# Patient Record
Sex: FEMALE | Race: WHITE | NOT HISPANIC OR LATINO
[De-identification: names, ages, dates, MRNs, and addresses within clinical notes are randomized per-mention and may not be internally consistent; named-entity substitution may affect disease eponyms.]

---

## 2017-08-11 ENCOUNTER — APPOINTMENT (OUTPATIENT)
Dept: ORTHOPEDIC SURGERY | Facility: CLINIC | Age: 32
End: 2017-08-11
Payer: COMMERCIAL

## 2017-08-11 PROCEDURE — 99203 OFFICE O/P NEW LOW 30 MIN: CPT

## 2017-08-11 PROCEDURE — 73503 X-RAY EXAM HIP UNI 4/> VIEWS: CPT | Mod: LT

## 2017-08-16 ENCOUNTER — OUTPATIENT (OUTPATIENT)
Dept: OUTPATIENT SERVICES | Facility: HOSPITAL | Age: 32
LOS: 1 days | End: 2017-08-16
Payer: COMMERCIAL

## 2017-08-16 ENCOUNTER — APPOINTMENT (OUTPATIENT)
Dept: MRI IMAGING | Facility: CLINIC | Age: 32
End: 2017-08-16
Payer: COMMERCIAL

## 2017-08-16 DIAGNOSIS — M25.852 OTHER SPECIFIED JOINT DISORDERS, LEFT HIP: ICD-10-CM

## 2017-08-16 DIAGNOSIS — Z00.8 ENCOUNTER FOR OTHER GENERAL EXAMINATION: ICD-10-CM

## 2017-08-16 PROCEDURE — 73721 MRI JNT OF LWR EXTRE W/O DYE: CPT

## 2017-08-16 PROCEDURE — 73721 MRI JNT OF LWR EXTRE W/O DYE: CPT | Mod: 26,LT

## 2017-11-18 ENCOUNTER — TRANSCRIPTION ENCOUNTER (OUTPATIENT)
Age: 32
End: 2017-11-18

## 2017-11-24 ENCOUNTER — TRANSCRIPTION ENCOUNTER (OUTPATIENT)
Age: 32
End: 2017-11-24

## 2017-12-01 ENCOUNTER — APPOINTMENT (OUTPATIENT)
Dept: INTERNAL MEDICINE | Facility: CLINIC | Age: 32
End: 2017-12-01
Payer: COMMERCIAL

## 2017-12-01 VITALS
TEMPERATURE: 98.2 F | DIASTOLIC BLOOD PRESSURE: 88 MMHG | OXYGEN SATURATION: 96 % | HEART RATE: 100 BPM | RESPIRATION RATE: 16 BRPM | SYSTOLIC BLOOD PRESSURE: 139 MMHG | HEIGHT: 67 IN

## 2017-12-01 PROCEDURE — 99214 OFFICE O/P EST MOD 30 MIN: CPT

## 2017-12-01 RX ORDER — PREDNISONE 20 MG/1
20 TABLET ORAL
Qty: 6 | Refills: 0 | Status: DISCONTINUED | COMMUNITY
Start: 2017-11-18 | End: 2017-12-01

## 2018-01-19 ENCOUNTER — APPOINTMENT (OUTPATIENT)
Dept: INTERNAL MEDICINE | Facility: CLINIC | Age: 33
End: 2018-01-19
Payer: COMMERCIAL

## 2018-01-19 ENCOUNTER — LABORATORY RESULT (OUTPATIENT)
Age: 33
End: 2018-01-19

## 2018-01-19 VITALS
TEMPERATURE: 98.5 F | HEART RATE: 100 BPM | HEIGHT: 67 IN | RESPIRATION RATE: 16 BRPM | SYSTOLIC BLOOD PRESSURE: 134 MMHG | OXYGEN SATURATION: 98 % | DIASTOLIC BLOOD PRESSURE: 84 MMHG | WEIGHT: 215 LBS | BODY MASS INDEX: 33.74 KG/M2

## 2018-01-19 DIAGNOSIS — Z87.09 PERSONAL HISTORY OF OTHER DISEASES OF THE RESPIRATORY SYSTEM: ICD-10-CM

## 2018-01-19 DIAGNOSIS — R05 COUGH: ICD-10-CM

## 2018-01-19 PROCEDURE — 99395 PREV VISIT EST AGE 18-39: CPT

## 2018-01-19 RX ORDER — LEVOFLOXACIN 500 MG/1
500 TABLET, FILM COATED ORAL DAILY
Qty: 7 | Refills: 0 | Status: DISCONTINUED | COMMUNITY
Start: 2017-12-01 | End: 2018-01-19

## 2018-01-19 RX ORDER — BENZONATATE 100 MG/1
100 CAPSULE ORAL
Qty: 21 | Refills: 0 | Status: DISCONTINUED | COMMUNITY
Start: 2017-11-18 | End: 2018-01-19

## 2018-01-19 RX ORDER — PROMETHAZINE HYDROCHLORIDE AND DEXTROMETHORPHAN HYDROBROMIDE ORAL SOLUTION 15; 6.25 MG/5ML; MG/5ML
6.25-15 SOLUTION ORAL EVERY 8 HOURS
Qty: 1 | Refills: 1 | Status: DISCONTINUED | COMMUNITY
Start: 2017-12-01 | End: 2018-01-19

## 2018-01-23 LAB
25(OH)D3 SERPL-MCNC: 21.3 NG/ML
ALBUMIN SERPL ELPH-MCNC: 4.5 G/DL
ALP BLD-CCNC: 75 U/L
ALT SERPL-CCNC: 50 U/L
ANION GAP SERPL CALC-SCNC: 18 MMOL/L
APPEARANCE: ABNORMAL
AST SERPL-CCNC: 25 U/L
BASOPHILS # BLD AUTO: 0.02 K/UL
BASOPHILS NFR BLD AUTO: 0.2 %
BILIRUB SERPL-MCNC: 0.5 MG/DL
BILIRUBIN URINE: NEGATIVE
BLOOD URINE: NEGATIVE
BUN SERPL-MCNC: 10 MG/DL
CALCIUM SERPL-MCNC: 10.6 MG/DL
CHLORIDE SERPL-SCNC: 100 MMOL/L
CO2 SERPL-SCNC: 23 MMOL/L
COLOR: YELLOW
CREAT SERPL-MCNC: 0.88 MG/DL
EOSINOPHIL # BLD AUTO: 0.09 K/UL
EOSINOPHIL NFR BLD AUTO: 0.9 %
FERRITIN SERPL-MCNC: 189 NG/ML
FOLATE SERPL-MCNC: 4.3 NG/ML
GLUCOSE QUALITATIVE U: NEGATIVE MG/DL
GLUCOSE SERPL-MCNC: 75 MG/DL
HAV IGM SER QL: NONREACTIVE
HBA1C MFR BLD HPLC: 4.9 %
HBV CORE IGM SER QL: NONREACTIVE
HBV SURFACE AG SER QL: NONREACTIVE
HCT VFR BLD CALC: 43.2 %
HCV AB SER QL: NONREACTIVE
HCV RNA SERPL NAA DL=5-ACNC: NOT DETECTED IU/ML
HCV RNA SERPL NAA+PROBE-LOG IU: NOT DETECTED LOGIU/ML
HCV S/CO RATIO: 0.21 S/CO
HGB BLD-MCNC: 14.6 G/DL
IMM GRANULOCYTES NFR BLD AUTO: 0.1 %
IRON SERPL-MCNC: 78 UG/DL
KETONES URINE: NEGATIVE
LEUKOCYTE ESTERASE URINE: NEGATIVE
LYMPHOCYTES # BLD AUTO: 3.59 K/UL
LYMPHOCYTES NFR BLD AUTO: 37.6 %
MAN DIFF?: NORMAL
MCHC RBC-ENTMCNC: 31.7 PG
MCHC RBC-ENTMCNC: 33.8 GM/DL
MCV RBC AUTO: 93.7 FL
MONOCYTES # BLD AUTO: 0.44 K/UL
MONOCYTES NFR BLD AUTO: 4.6 %
NEUTROPHILS # BLD AUTO: 5.41 K/UL
NEUTROPHILS NFR BLD AUTO: 56.6 %
NITRITE URINE: NEGATIVE
PH URINE: 5.5
PLATELET # BLD AUTO: 305 K/UL
POTASSIUM SERPL-SCNC: 4.6 MMOL/L
PROT SERPL-MCNC: 7.8 G/DL
PROTEIN URINE: NEGATIVE MG/DL
RBC # BLD: 4.61 M/UL
RBC # FLD: 13.9 %
SODIUM SERPL-SCNC: 141 MMOL/L
SPECIFIC GRAVITY URINE: 1.02
TSH SERPL-ACNC: 2.99 UIU/ML
UROBILINOGEN URINE: NEGATIVE MG/DL
VIT B12 SERPL-MCNC: 807 PG/ML
WBC # FLD AUTO: 9.56 K/UL

## 2018-04-22 ENCOUNTER — RESULT REVIEW (OUTPATIENT)
Age: 33
End: 2018-04-22

## 2019-08-01 ENCOUNTER — APPOINTMENT (OUTPATIENT)
Dept: DERMATOLOGY | Facility: CLINIC | Age: 34
End: 2019-08-01
Payer: COMMERCIAL

## 2019-08-01 PROCEDURE — 99203 OFFICE O/P NEW LOW 30 MIN: CPT

## 2019-08-01 NOTE — PHYSICAL EXAM
[FreeTextEntry3] : AAOx3, pleasant, NAD, no visual lymphadenopathy\par hair, scalp, face, nose, eyelids, ears, lips, oropharynx, neck, chest, abdomen, back, right arm, left arm, nails, and hands examined with all normal findings,\par pertinent findings include:\par \par 2 cystic nodules on scalp

## 2019-08-01 NOTE — HISTORY OF PRESENT ILLNESS
[de-identified] : 34 year old here for cysts on scalp. Has previously had excisions previously.  [FreeTextEntry1] : cysts on scalp

## 2019-09-11 ENCOUNTER — APPOINTMENT (OUTPATIENT)
Dept: DERMATOLOGY | Facility: CLINIC | Age: 34
End: 2019-09-11
Payer: COMMERCIAL

## 2019-09-11 ENCOUNTER — LABORATORY RESULT (OUTPATIENT)
Age: 34
End: 2019-09-11

## 2019-09-11 PROCEDURE — 12032 INTMD RPR S/A/T/EXT 2.6-7.5: CPT

## 2019-09-11 PROCEDURE — 11402 EXC TR-EXT B9+MARG 1.1-2 CM: CPT

## 2019-09-17 ENCOUNTER — LABORATORY RESULT (OUTPATIENT)
Age: 34
End: 2019-09-17

## 2019-09-18 ENCOUNTER — APPOINTMENT (OUTPATIENT)
Dept: DERMATOLOGY | Facility: CLINIC | Age: 34
End: 2019-09-18
Payer: COMMERCIAL

## 2019-09-18 PROCEDURE — 11402 EXC TR-EXT B9+MARG 1.1-2 CM: CPT | Mod: GC,58

## 2019-09-18 PROCEDURE — 12031 INTMD RPR S/A/T/EXT 2.5 CM/<: CPT | Mod: GC,58

## 2019-09-25 ENCOUNTER — APPOINTMENT (OUTPATIENT)
Dept: DERMATOLOGY | Facility: CLINIC | Age: 34
End: 2019-09-25
Payer: COMMERCIAL

## 2019-09-25 PROCEDURE — 99024 POSTOP FOLLOW-UP VISIT: CPT | Mod: GC

## 2019-10-03 ENCOUNTER — APPOINTMENT (OUTPATIENT)
Dept: INTERNAL MEDICINE | Facility: CLINIC | Age: 34
End: 2019-10-03
Payer: COMMERCIAL

## 2019-10-03 VITALS
HEIGHT: 67 IN | DIASTOLIC BLOOD PRESSURE: 83 MMHG | BODY MASS INDEX: 31.86 KG/M2 | WEIGHT: 203 LBS | HEART RATE: 84 BPM | SYSTOLIC BLOOD PRESSURE: 124 MMHG | OXYGEN SATURATION: 98 %

## 2019-10-03 DIAGNOSIS — L72.11 PILAR CYST: ICD-10-CM

## 2019-10-03 DIAGNOSIS — K21.9 GASTRO-ESOPHAGEAL REFLUX DISEASE W/OUT ESOPHAGITIS: ICD-10-CM

## 2019-10-03 PROCEDURE — 99395 PREV VISIT EST AGE 18-39: CPT

## 2019-10-03 RX ORDER — ALBUTEROL SULFATE 90 UG/1
108 (90 BASE) AEROSOL, METERED RESPIRATORY (INHALATION)
Qty: 18 | Refills: 0 | Status: DISCONTINUED | COMMUNITY
Start: 2017-11-24 | End: 2019-10-03

## 2019-10-03 RX ORDER — IPRATROPIUM BROMIDE 21 UG/1
0.03 SPRAY NASAL
Qty: 30 | Refills: 0 | Status: DISCONTINUED | COMMUNITY
Start: 2017-11-24 | End: 2019-10-03

## 2019-10-03 RX ORDER — RANITIDINE HCL 150 MG
CAPSULE ORAL
Refills: 0 | Status: DISCONTINUED | COMMUNITY
End: 2019-10-03

## 2019-12-03 ENCOUNTER — TRANSCRIPTION ENCOUNTER (OUTPATIENT)
Age: 34
End: 2019-12-03

## 2020-01-01 ENCOUNTER — TRANSCRIPTION ENCOUNTER (OUTPATIENT)
Age: 35
End: 2020-01-01

## 2020-04-25 ENCOUNTER — MESSAGE (OUTPATIENT)
Age: 35
End: 2020-04-25

## 2020-05-06 ENCOUNTER — APPOINTMENT (OUTPATIENT)
Dept: DISASTER EMERGENCY | Facility: HOSPITAL | Age: 35
End: 2020-05-06

## 2020-05-07 LAB
SARS-COV-2 IGG SERPL IA-ACNC: <0.1 INDEX
SARS-COV-2 IGG SERPL QL IA: NEGATIVE

## 2020-08-14 ENCOUNTER — APPOINTMENT (OUTPATIENT)
Dept: ORTHOPEDIC SURGERY | Facility: CLINIC | Age: 35
End: 2020-08-14
Payer: COMMERCIAL

## 2020-08-14 ENCOUNTER — APPOINTMENT (OUTPATIENT)
Dept: MRI IMAGING | Facility: CLINIC | Age: 35
End: 2020-08-14
Payer: COMMERCIAL

## 2020-08-14 ENCOUNTER — OUTPATIENT (OUTPATIENT)
Dept: OUTPATIENT SERVICES | Facility: HOSPITAL | Age: 35
LOS: 1 days | End: 2020-08-14
Payer: COMMERCIAL

## 2020-08-14 VITALS
HEART RATE: 89 BPM | SYSTOLIC BLOOD PRESSURE: 131 MMHG | BODY MASS INDEX: 39.27 KG/M2 | HEIGHT: 60 IN | DIASTOLIC BLOOD PRESSURE: 85 MMHG | WEIGHT: 200 LBS

## 2020-08-14 VITALS — TEMPERATURE: 98 F

## 2020-08-14 DIAGNOSIS — Z00.8 ENCOUNTER FOR OTHER GENERAL EXAMINATION: ICD-10-CM

## 2020-08-14 PROCEDURE — 73502 X-RAY EXAM HIP UNI 2-3 VIEWS: CPT | Mod: LT

## 2020-08-14 PROCEDURE — 99203 OFFICE O/P NEW LOW 30 MIN: CPT

## 2020-08-14 PROCEDURE — 73721 MRI JNT OF LWR EXTRE W/O DYE: CPT | Mod: 26,LT

## 2020-08-14 PROCEDURE — 73721 MRI JNT OF LWR EXTRE W/O DYE: CPT

## 2020-08-14 NOTE — DISCUSSION/SUMMARY
[de-identified] : Left hip osteoarthritis, labral tearing and femoral acetabular impingement. We'll arrange for a guided intra-articular injection for pain control. We will obtain a new MRI to further discuss surgical planning based on results of her MRI and the underlying chondral damage. all questions answered

## 2020-08-14 NOTE — PHYSICAL EXAM
[de-identified] : L hip exam\par \par Skin: Clean/dry and intact\par Inspection: No obvious deformity, no swelling, no ecchymosis.\par Tenderness: Significant tenderness over greater trochanter/glut medius insertion. No tenderness pubic symphysis, pubic tubercle, hip flexors. No ttp ischial tuberosity or buttock. No ttp over the ASIS/Illiac crest.\par ROM: 0-120°. Internal rotation 10° external rotation 60° (right 40/60)\par Painful ROM: None\par Additional tests: Negative Santosh negative Stinchfield positive impingement at 60 and 90\par Strength: 5/5 hip flexion/ADD/ABD/Q/H/TA/GS/EHL\par Neuro: Sensation in tact to light touch throughout in dp/sp/tib/angelo/saph distributions\par Pulses: 2+ DP/PT pulses [de-identified] : \par The following radiographs were ordered and read by me during this patients visit. I reviewed each radiograph in detail with the patient and discussed the findings as highlighted below. \par \par AP pelvis AP lateral left hip were obtained today 3 if there are moderate degenerative changes with joint space narrowing and marginal osteophytes. Slight progression from previous. Cam type impingement\par \par

## 2020-08-14 NOTE — HISTORY OF PRESENT ILLNESS
[de-identified] : 34yo female presents for follow up right hip pain. She was here approximately 3 years ago, had MRI showing cam deformity, EDEN, chondral loss and cystic changes within her hip. Since then she has not done much to treat this. She takes occasional anti-inflammatory medicine. If his pain deep within her hip, lateral aspect. 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 Overall worsening. Denies numbness tingling

## 2020-08-18 ENCOUNTER — APPOINTMENT (OUTPATIENT)
Dept: ORTHOPEDIC SURGERY | Facility: CLINIC | Age: 35
End: 2020-08-18
Payer: COMMERCIAL

## 2020-08-18 PROCEDURE — 99441: CPT

## 2020-08-21 ENCOUNTER — APPOINTMENT (OUTPATIENT)
Dept: ORTHOPEDIC SURGERY | Facility: CLINIC | Age: 35
End: 2020-08-21
Payer: COMMERCIAL

## 2020-08-21 VITALS
SYSTOLIC BLOOD PRESSURE: 128 MMHG | HEART RATE: 79 BPM | DIASTOLIC BLOOD PRESSURE: 88 MMHG | WEIGHT: 198 LBS | BODY MASS INDEX: 38.87 KG/M2 | HEIGHT: 60 IN

## 2020-08-21 PROCEDURE — 99214 OFFICE O/P EST MOD 30 MIN: CPT

## 2020-08-21 PROCEDURE — 72100 X-RAY EXAM L-S SPINE 2/3 VWS: CPT

## 2020-08-21 NOTE — DISCUSSION/SUMMARY
[de-identified] : Left hip advanced degenerative joint disease secondary to EDEN\par The natural history and treatment of degenerative arthritis was discussed with the patient at length today. The spectrum of treatment including nonoperative modalities to surgical intervention was elucidated. Noninvasive and nonoperative treatment modalities include weight reduction, activity modification with low impact exercise,  as needed use of acetaminophen or anti-inflammatory medications if tolerated, glucosamine/chondroitin supplements, and physical therapy. Further treatments can include corticosteroid injection and the use of viscosupplementation with hyaluronic acid injections. Definitive surgical treatment can certainly include total joint arthroplasty also. The risks and benefits of each treatment options was discussed and all questions were answered.\par In view of lack of adequate pain relief with conservative (non-surgical) management protocol including physical therapy, home exercises, weight loss, activity modification, NSAIDS; the patient is recommended to consider a LEFT Total Hip Replacement. \par \par The risks, benefits, alternatives, implications, complications including but not limited to pain, stiffness, bleeding, limp, wound breakdown, infection, limb length discrepancy, dislocation, bone fracture, nerve and vascular compromise, implant wear and durability issues and rehabilitation were discussed and relevant questions were addressed. The possibility of recurrent pain, no improvement in pain and actual worsening of the pain were also mentioned in conversation with the patient. Medical complications related to the patient's general medical health including deep vein thrombosis, pulmonary embolus, heart attack, stroke, death and other complications from anesthesia were discussed as well. Anticoagulation prophylaxis medication options to address risks of deep vein thrombosis and pulmonary embolism were discussed and weighed against the risks of bleeding and wound healing complications. The patient elected Ecotrin/Xarelto prophylaxis with mechanical modalities.\par \par I have reviewed the plan of care as well as a model of a total hip replacement implant equivalent to the one that will be used for their total hip replacement.  The patient agrees with the plan of care, as well as the use of implants for their total hip replacement.  The patient wishes to proceed and will undergo preoperative medical evaluation and clearance, attend the preoperative educational class and will schedule surgery appropriately.\par \par She is a good candidate for same day discharge following the operation due to minimal comorbidities and age. \par

## 2020-08-21 NOTE — CONSULT LETTER
[Dear  ___] : Dear  [unfilled], [Please see my note below.] : Please see my note below. [Consult Closing:] : Thank you very much for allowing me to participate in the care of this patient.  If you have any questions, please do not hesitate to contact me. [Consult Letter:] : I had the pleasure of evaluating your patient, [unfilled]. [Sincerely,] : Sincerely, [FreeTextEntry2] : JONELLE YOUNG\par  [FreeTextEntry3] : Joseph Garcia MD\par \par ______________________________________________\par [facility]: Hip/Knee Arthroplasty\par [address], [address], [place] [address]\par (t) [phone]\par (f) [phone]\par

## 2020-08-21 NOTE — PHYSICAL EXAM
[de-identified] : On general examination the patient is adequately groomed and nourished. The vital parameters are as recorded. \par There is no lymphedema or diffuse swelling, no varicose veins, no skin warmth/erythema/scars/swelling, no ulcers and no palpable lymph nodes or masses in both lower extremities. Bilateral pedal pulses are well palpable.\par Upper Extremity:\par Both right and left upper extremities are unremarkable in terms of skin rash, lesions, pigmentation, redness, tenderness, swelling, joint instability, abnormal deformity or crepitus. The overall range of motion, sensation, motor tone and strength testing are normal.\par \par Hip Exam:\par The gait is left stiff hip coxalgic.\par The patient has equal leg lengths and no pelvic tilt. \par Shabbir/Santosh test is 6 inches on the right and 12 inches on the left. \par Active SLR is 60 degrees on the right and 40 degrees on the left. Both hips demonstrate no scars and the skin has no signs of inflammation or tenderness. \par Both Hips have a range of motion that is symmetrical in flexion and extension of:\par Hip flexion:            Right 100 degrees               Left 90 degrees\par Hip abduction:      Right 40 degrees                  Left 20 degrees\par Hip adduction:      Right 20 degrees                  Left  10 degrees\par Internal rotation:      Right 20 degrees                 Left 10 degrees\par External rotation:     Right 40 degrees                 Left 20 degrees\par There is no flexion contracture, deformity or instability. \par Labral impingement tests are negative.\par Right hip flexor, abductor and extensor power is grade 5.\par Left hip flexor, abductor and extensor power is grade 4+.\par \par Spine: There is no curvature of the spine or loss of normal lumbar lordosis. The skin is devoid of erythema, scars, pigmentation or rashes. There is no lumbar spasm and no tenderness especially at L4-L5 or L5-S1. \par The range of motion of the lumbar spine is normal and painless in flexion, extension, lateral flexion and rotation. There is no lumbar spine imbalance or instability detected.\par Bilateral passive SLR is 60 degrees in supine and sitting positions. Lasegue's Test is negative.\par Neurology:\par The patient is alert and oriented in person, place and time. The mood is calm and affect is normal.\par Testing for coordination including Rhomberg's Test and Finger-Nose Test, sensation, motor tone and power and deep tendon reflexes in both lower extremities is normal.\par \par \par  [de-identified] : The following radiographs were taken previously and reviewed by me during this patients visit. I reviewed each radiograph in detail with the patient and discussed the findings as highlighted below. \par AP and false profile views of the left hip and AP view of the pelvis confirm degenerative joint disease with joint space narrowing and osteophyte formation, with CAM impingement. \par MRI of the left hip taken 8/14/2020, compared to previous MRI of the left hip taken on 8/16/2017, reviewed in detail\par There is a nondisplaced anterior labral tear. There is extensive DJD of the left hip, with chondral loss and subchondral cystic change in the anterior acetabulum. There is a CAM-type deformity. There is moderate joint effusion with synovitis which has worsened compared to previous imaging. There is no fracture. There is cystic change in the femoral head as well. The above is significant for advanced degenerative joint disease of the left hip. \par AP and lateral views of the lumbar spine reveal no DJD.

## 2020-08-21 NOTE — HISTORY OF PRESENT ILLNESS
[Constant] : ~He/She~ states the symptoms seem to be constant [9] : a current pain level of 9/10 [Worsening] : worsening [Rest] : relieved by rest [Walking] : worsened by walking [de-identified] : Ms. JOE DELUCA is a 35 year old female  presents with left hip pain, present for many years now worsening. She is a nurse at [employer], and was sent by Dr. Ochoa for evaluation today.  She localizes the pain to the groin and lateral aspect of the left hip.  The patient describes the pain as constant and sharp, and states it is intermittently worsened by activity. She states the pain is present when walking, climbing stairs, standing for long periods, and deep flexion of the hip.  She admits to worsening stiffness of the hip, which is now interfering with ADLs such as dressing and toe nail care. She has been taking NSAIDs for pain with minimal relief. She has had an MRI of the left hip in the past revealing labral tear and impingement, and one recently revealing extensive DJD of the left hip. She denies injection to the left hip.  The patient admits to limitations in her  quality of life, and is present to discuss options for treatment.\par

## 2020-10-15 ENCOUNTER — APPOINTMENT (OUTPATIENT)
Dept: INTERNAL MEDICINE | Facility: CLINIC | Age: 35
End: 2020-10-15
Payer: COMMERCIAL

## 2020-10-15 VITALS
HEART RATE: 85 BPM | OXYGEN SATURATION: 98 % | DIASTOLIC BLOOD PRESSURE: 85 MMHG | HEIGHT: 62 IN | TEMPERATURE: 98.6 F | BODY MASS INDEX: 37.17 KG/M2 | SYSTOLIC BLOOD PRESSURE: 125 MMHG | WEIGHT: 202 LBS

## 2020-10-15 DIAGNOSIS — Z87.898 PERSONAL HISTORY OF OTHER SPECIFIED CONDITIONS: ICD-10-CM

## 2020-10-15 DIAGNOSIS — R53.83 OTHER MALAISE: ICD-10-CM

## 2020-10-15 DIAGNOSIS — R53.81 OTHER MALAISE: ICD-10-CM

## 2020-10-15 DIAGNOSIS — E66.9 OBESITY, UNSPECIFIED: ICD-10-CM

## 2020-10-15 DIAGNOSIS — R82.90 UNSPECIFIED ABNORMAL FINDINGS IN URINE: ICD-10-CM

## 2020-10-15 DIAGNOSIS — Z87.42 PERSONAL HISTORY OF OTHER DISEASES OF THE FEMALE GENITAL TRACT: ICD-10-CM

## 2020-10-15 DIAGNOSIS — R79.89 OTHER SPECIFIED ABNORMAL FINDINGS OF BLOOD CHEMISTRY: ICD-10-CM

## 2020-10-15 PROCEDURE — 99213 OFFICE O/P EST LOW 20 MIN: CPT | Mod: 25

## 2020-10-15 PROCEDURE — 99395 PREV VISIT EST AGE 18-39: CPT | Mod: 25

## 2020-10-15 PROCEDURE — 36415 COLL VENOUS BLD VENIPUNCTURE: CPT

## 2020-10-15 PROCEDURE — G0444 DEPRESSION SCREEN ANNUAL: CPT | Mod: NC,59

## 2020-10-15 PROCEDURE — G0447 BEHAVIOR COUNSEL OBESITY 15M: CPT | Mod: NC,59

## 2020-10-15 NOTE — HISTORY OF PRESENT ILLNESS
[de-identified] : 34 y/o female patient presents today for annual CPE/fasting labs \par - HCM - UTD preventive screenings, last PAP done 9/2020 WNL, flu vaccine to be done at work \par - f/u chronic left hip secondary to osteoarthritis- following with Chris Ponce & [facility], taking celebrex as needed for pain, will undergo hip replacement surgery scheduled for December, patient to return for medical clearance, Ortho requesting ESR and CRP drawn

## 2020-10-15 NOTE — HEALTH RISK ASSESSMENT
[] : No [No] : No [No falls in past year] : Patient reported no falls in the past year [0] : 2) Feeling down, depressed, or hopeless: Not at all (0) [[id number]] : 0 [HIV test declined] : HIV test declined [Patient reported PAP Smear was normal] : Patient reported PAP Smear was normal [Change in mental status noted] : No change in mental status noted [Hepatitis C test declined] : Hepatitis C test declined [Employed] : employed [With Family] : lives with family [None] : None [# Of Children ___] : has [unfilled] children [Sexually Active] : sexually active [Reports changes in hearing] : Reports no changes in hearing [High Risk Behavior] : no high risk behavior [Reports changes in vision] : Reports no changes in vision [Reports changes in dental health] : Reports no changes in dental health [PapSmearDate] : 09/2020 [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

## 2020-10-15 NOTE — PHYSICAL EXAM
[No Acute Distress] : no acute distress [Well Nourished] : well nourished [Well Developed] : well developed [Well-Appearing] : well-appearing [Normal Sclera/Conjunctiva] : normal sclera/conjunctiva [PERRL] : pupils equal round and reactive to light [EOMI] : extraocular movements intact [Normal Outer Ear/Nose] : the outer ears and nose were normal in appearance [Normal Oropharynx] : the oropharynx was normal [No Lymphadenopathy] : no lymphadenopathy [No JVD] : no jugular venous distention [Supple] : supple [Thyroid Normal, No Nodules] : the thyroid was normal and there were no nodules present [No Respiratory Distress] : no respiratory distress  [No Accessory Muscle Use] : no accessory muscle use [Clear to Auscultation] : lungs were clear to auscultation bilaterally [Normal Rate] : normal rate  [Regular Rhythm] : with a regular rhythm [Normal S1, S2] : normal S1 and S2 [No Murmur] : no murmur heard [No Carotid Bruits] : no carotid bruits [No Abdominal Bruit] : a ~M bruit was not heard ~T in the abdomen [No Varicosities] : no varicosities [Pedal Pulses Present] : the pedal pulses are present [No Edema] : there was no peripheral edema [No Palpable Aorta] : no palpable aorta [No Extremity Clubbing/Cyanosis] : no extremity clubbing/cyanosis [Soft] : abdomen soft [Non Tender] : non-tender [Non-distended] : non-distended [No Masses] : no abdominal mass palpated [No HSM] : no HSM [Normal Bowel Sounds] : normal bowel sounds [Normal Posterior Cervical Nodes] : no posterior cervical lymphadenopathy [Normal Anterior Cervical Nodes] : no anterior cervical lymphadenopathy [No CVA Tenderness] : no CVA  tenderness [No Spinal Tenderness] : no spinal tenderness [No Joint Swelling] : no joint swelling [Grossly Normal Strength/Tone] : grossly normal strength/tone [No Rash] : no rash [Coordination Grossly Intact] : coordination grossly intact [No Focal Deficits] : no focal deficits [Normal Gait] : normal gait [Normal Affect] : the affect was normal [Normal Insight/Judgement] : insight and judgment were intact [Normal Nasal Mucosa] : the nasal mucosa was normal [Normal TMs] : both tympanic membranes were normal [Speech Grossly Normal] : speech grossly normal [Memory Grossly Normal] : memory grossly normal [Alert and Oriented x3] : oriented to person, place, and time [de-identified] : +left hip generalized tenderness, decreased ROM, no swelling, no effusion  [Normal Mood] : the mood was normal

## 2020-10-15 NOTE — COUNSELING
[Potential consequences of obesity discussed] : Potential consequences of obesity discussed [Benefits of weight loss discussed] : Benefits of weight loss discussed [Encouraged to increase physical activity] : Encouraged to increase physical activity [Weigh Self Weekly] : weigh self weekly [Decrease Portions] : decrease portions [Good understanding] : Patient has a good understanding of disease, goals and obesity follow-up plan

## 2020-10-15 NOTE — REVIEW OF SYSTEMS
[Negative] : Heme/Lymph [Joint Pain] : joint pain [Joint Stiffness] : joint stiffness [Joint Swelling] : no joint swelling [Muscle Weakness] : no muscle weakness [Muscle Pain] : no muscle pain [Back Pain] : no back pain [FreeTextEntry9] : +left hip pain

## 2020-10-20 ENCOUNTER — TRANSCRIPTION ENCOUNTER (OUTPATIENT)
Age: 35
End: 2020-10-20

## 2020-10-20 DIAGNOSIS — Z86.39 PERSONAL HISTORY OF OTHER ENDOCRINE, NUTRITIONAL AND METABOLIC DISEASE: ICD-10-CM

## 2020-10-20 LAB
25(OH)D3 SERPL-MCNC: 31.8 NG/ML
ALBUMIN SERPL ELPH-MCNC: 4.7 G/DL
ALP BLD-CCNC: 64 U/L
ALT SERPL-CCNC: 29 U/L
ANION GAP SERPL CALC-SCNC: 12 MMOL/L
APPEARANCE: CLEAR
AST SERPL-CCNC: 19 U/L
BACTERIA: NEGATIVE
BASOPHILS # BLD AUTO: 0.04 K/UL
BASOPHILS NFR BLD AUTO: 0.6 %
BILIRUB SERPL-MCNC: 0.7 MG/DL
BILIRUBIN URINE: NEGATIVE
BLOOD URINE: NEGATIVE
BUN SERPL-MCNC: 10 MG/DL
CALCIUM SERPL-MCNC: 10 MG/DL
CHLORIDE SERPL-SCNC: 106 MMOL/L
CHOLEST SERPL-MCNC: 177 MG/DL
CHOLEST/HDLC SERPL: 4.1 RATIO
CO2 SERPL-SCNC: 24 MMOL/L
COLOR: YELLOW
CREAT SERPL-MCNC: 0.66 MG/DL
CRP SERPL-MCNC: 0.31 MG/DL
EOSINOPHIL # BLD AUTO: 0.09 K/UL
EOSINOPHIL NFR BLD AUTO: 1.4 %
ERYTHROCYTE [SEDIMENTATION RATE] IN BLOOD BY WESTERGREN METHOD: 27 MM/HR
ESTIMATED AVERAGE GLUCOSE: 94 MG/DL
GLUCOSE QUALITATIVE U: NEGATIVE
GLUCOSE SERPL-MCNC: 91 MG/DL
HBA1C MFR BLD HPLC: 4.9 %
HCT VFR BLD CALC: 43.3 %
HDLC SERPL-MCNC: 44 MG/DL
HGB BLD-MCNC: 14.3 G/DL
HYALINE CASTS: 4 /LPF
IMM GRANULOCYTES NFR BLD AUTO: 0.3 %
KETONES URINE: NEGATIVE
LDLC SERPL CALC-MCNC: 108 MG/DL
LEUKOCYTE ESTERASE URINE: ABNORMAL
LYMPHOCYTES # BLD AUTO: 2.33 K/UL
LYMPHOCYTES NFR BLD AUTO: 35.1 %
MAGNESIUM SERPL-MCNC: 2.1 MG/DL
MAN DIFF?: NORMAL
MCHC RBC-ENTMCNC: 31.6 PG
MCHC RBC-ENTMCNC: 33 GM/DL
MCV RBC AUTO: 95.8 FL
MICROSCOPIC-UA: NORMAL
MONOCYTES # BLD AUTO: 0.33 K/UL
MONOCYTES NFR BLD AUTO: 5 %
NEUTROPHILS # BLD AUTO: 3.83 K/UL
NEUTROPHILS NFR BLD AUTO: 57.6 %
NITRITE URINE: NEGATIVE
PH URINE: 5.5
PHOSPHATE SERPL-MCNC: 3.6 MG/DL
PLATELET # BLD AUTO: 253 K/UL
POTASSIUM SERPL-SCNC: 4.6 MMOL/L
PROT SERPL-MCNC: 7.4 G/DL
PROTEIN URINE: NEGATIVE
RBC # BLD: 4.52 M/UL
RBC # FLD: 12.7 %
RED BLOOD CELLS URINE: 4 /HPF
SODIUM SERPL-SCNC: 143 MMOL/L
SPECIFIC GRAVITY URINE: 1.02
SQUAMOUS EPITHELIAL CELLS: 6 /HPF
TRIGL SERPL-MCNC: 126 MG/DL
TSH SERPL-ACNC: 1.37 UIU/ML
UROBILINOGEN URINE: NORMAL
VIT B12 SERPL-MCNC: 589 PG/ML
WBC # FLD AUTO: 6.64 K/UL
WHITE BLOOD CELLS URINE: 10 /HPF

## 2020-10-26 LAB — PTH RELATED PROT SERPL-MCNC: <2 PMOL/L

## 2020-11-20 ENCOUNTER — APPOINTMENT (OUTPATIENT)
Dept: INTERNAL MEDICINE | Facility: CLINIC | Age: 35
End: 2020-11-20
Payer: COMMERCIAL

## 2020-11-20 VITALS
SYSTOLIC BLOOD PRESSURE: 141 MMHG | TEMPERATURE: 98.1 F | HEART RATE: 89 BPM | BODY MASS INDEX: 36.8 KG/M2 | HEIGHT: 62 IN | WEIGHT: 200 LBS | RESPIRATION RATE: 14 BRPM | DIASTOLIC BLOOD PRESSURE: 95 MMHG | OXYGEN SATURATION: 99 %

## 2020-11-20 PROCEDURE — 99213 OFFICE O/P EST LOW 20 MIN: CPT | Mod: 25

## 2020-11-20 PROCEDURE — G0447 BEHAVIOR COUNSEL OBESITY 15M: CPT | Mod: 59

## 2020-11-23 RX ORDER — MEDROXYPROGESTERONE ACETATE 5 MG/1
5 TABLET ORAL DAILY
Qty: 10 | Refills: 0 | Status: DISCONTINUED | COMMUNITY
Start: 2020-10-15 | End: 2020-11-22

## 2020-11-23 NOTE — HISTORY OF PRESENT ILLNESS
[de-identified] : 34 y/o female patient presents today:\par - f/u weight management/obesity - patient was started on phentermine 15mg and Topamax 25mg x 1 month, states having dry mouth, and initially had headaches first few days of starting medications, doing well. States has started healthy diet, unable to do much physical activity due to chronic hip pain, will undergo hip replacement surgery in few weeks. Weight loss since last month: 11 lbs

## 2020-11-25 ENCOUNTER — OUTPATIENT (OUTPATIENT)
Dept: OUTPATIENT SERVICES | Facility: HOSPITAL | Age: 35
LOS: 1 days | Discharge: HOME | End: 2020-11-25
Payer: COMMERCIAL

## 2020-11-25 VITALS
TEMPERATURE: 98 F | SYSTOLIC BLOOD PRESSURE: 127 MMHG | DIASTOLIC BLOOD PRESSURE: 96 MMHG | HEART RATE: 100 BPM | WEIGHT: 187.17 LBS | OXYGEN SATURATION: 99 % | RESPIRATION RATE: 18 BRPM | HEIGHT: 60 IN

## 2020-11-25 VITALS — WEIGHT: 187.17 LBS | HEIGHT: 60 IN

## 2020-11-25 DIAGNOSIS — M16.12 UNILATERAL PRIMARY OSTEOARTHRITIS, LEFT HIP: ICD-10-CM

## 2020-11-25 DIAGNOSIS — E66.9 OBESITY, UNSPECIFIED: ICD-10-CM

## 2020-11-25 DIAGNOSIS — Z01.818 ENCOUNTER FOR OTHER PREPROCEDURAL EXAMINATION: ICD-10-CM

## 2020-11-25 LAB
A1C WITH ESTIMATED AVERAGE GLUCOSE RESULT: 4.8 % — SIGNIFICANT CHANGE UP (ref 4–5.6)
ANION GAP SERPL CALC-SCNC: 9 MMOL/L — SIGNIFICANT CHANGE UP (ref 5–17)
APTT BLD: 33.9 SEC — SIGNIFICANT CHANGE UP (ref 27.5–35.5)
BLD GP AB SCN SERPL QL: SIGNIFICANT CHANGE UP
BUN SERPL-MCNC: 10 MG/DL — SIGNIFICANT CHANGE UP (ref 7–23)
CALCIUM SERPL-MCNC: 9.3 MG/DL — SIGNIFICANT CHANGE UP (ref 8.5–10.1)
CHLORIDE SERPL-SCNC: 107 MMOL/L — SIGNIFICANT CHANGE UP (ref 96–108)
CO2 SERPL-SCNC: 25 MMOL/L — SIGNIFICANT CHANGE UP (ref 22–31)
CREAT SERPL-MCNC: 0.85 MG/DL — SIGNIFICANT CHANGE UP (ref 0.5–1.3)
ESTIMATED AVERAGE GLUCOSE: 91 MG/DL — SIGNIFICANT CHANGE UP (ref 68–114)
GLUCOSE SERPL-MCNC: 88 MG/DL — SIGNIFICANT CHANGE UP (ref 70–99)
HCG UR QL: NEGATIVE — SIGNIFICANT CHANGE UP
HCT VFR BLD CALC: 43.2 % — SIGNIFICANT CHANGE UP (ref 34.5–45)
HGB BLD-MCNC: 15.1 G/DL — SIGNIFICANT CHANGE UP (ref 11.5–15.5)
INR BLD: 1.15 RATIO — SIGNIFICANT CHANGE UP (ref 0.88–1.16)
MCHC RBC-ENTMCNC: 31 PG — SIGNIFICANT CHANGE UP (ref 27–34)
MCHC RBC-ENTMCNC: 35 GM/DL — SIGNIFICANT CHANGE UP (ref 32–36)
MCV RBC AUTO: 88.7 FL — SIGNIFICANT CHANGE UP (ref 80–100)
MRSA PCR RESULT.: SIGNIFICANT CHANGE UP
NRBC # BLD: 0 /100 WBCS — SIGNIFICANT CHANGE UP (ref 0–0)
PLATELET # BLD AUTO: 285 K/UL — SIGNIFICANT CHANGE UP (ref 150–400)
POTASSIUM SERPL-MCNC: 3.5 MMOL/L — SIGNIFICANT CHANGE UP (ref 3.5–5.3)
POTASSIUM SERPL-SCNC: 3.5 MMOL/L — SIGNIFICANT CHANGE UP (ref 3.5–5.3)
PROTHROM AB SERPL-ACNC: 13.3 SEC — SIGNIFICANT CHANGE UP (ref 10.6–13.6)
RBC # BLD: 4.87 M/UL — SIGNIFICANT CHANGE UP (ref 3.8–5.2)
RBC # FLD: 12.3 % — SIGNIFICANT CHANGE UP (ref 10.3–14.5)
S AUREUS DNA NOSE QL NAA+PROBE: SIGNIFICANT CHANGE UP
SODIUM SERPL-SCNC: 141 MMOL/L — SIGNIFICANT CHANGE UP (ref 135–145)
WBC # BLD: 5.91 K/UL — SIGNIFICANT CHANGE UP (ref 3.8–10.5)
WBC # FLD AUTO: 5.91 K/UL — SIGNIFICANT CHANGE UP (ref 3.8–10.5)

## 2020-11-25 PROCEDURE — 93010 ELECTROCARDIOGRAM REPORT: CPT

## 2020-11-25 RX ORDER — SODIUM CHLORIDE 9 MG/ML
3 INJECTION INTRAMUSCULAR; INTRAVENOUS; SUBCUTANEOUS EVERY 8 HOURS
Refills: 0 | Status: DISCONTINUED | OUTPATIENT
Start: 2020-12-10 | End: 2020-12-11

## 2020-11-25 NOTE — H&P PST ADULT - HISTORY OF PRESENT ILLNESS
35F...... 35F pmhx obesity, c/o left hip pain 2/2 unilateral primary osteoarthritis here for PST for scheduled left total hip arthroplasty  this patient denies any fever, cough, sob, flu like symptoms or travel outside of the [place] in the past 30 day

## 2020-11-25 NOTE — H&P PST ADULT - ASSESSMENT
35F pmhx obesity, c/o left hip pain 2/2 unilateral primary osteoarthritis here for PST for scheduled left total hip arthroplasty  CAPRINI SCORE    AGE RELATED RISK FACTORS                                                       MOBILITY RELATED FACTORS  [ ] Age 41-60 years                                            (1 Point)                  [ ] Bed rest                                                        (1 Point)  [ ] Age: 61-74 years                                           (2 Points)                [ ] Plaster cast                                                   (2 Points)  [ ] Age= 75 years                                              (3 Points)                 [ ] Bed bound for more than 72 hours                   (2 Points)    DISEASE RELATED RISK FACTORS                                               GENDER SPECIFIC FACTORS  [ ] Edema in the lower extremities                       (1 Point)                  [ ] Pregnancy                                                     (1 Point)  [ ] Varicose veins                                               (1 Point)                  [ ] Post-partum < 6 weeks                                   (1 Point)             [ x] BMI > 25 Kg/m2                                            (1 Point)                  [ ] Hormonal therapy  or oral contraception            (1 Point)                 [ ] Sepsis (in the previous month)                        (1 Point)                  [ ] History of pregnancy complications  [ ] Pneumonia or serious lung disease                                               [ ] Unexplained or recurrent                       (1 Point)           (in the previous month)                               (1 Point)  [ ] Abnormal pulmonary function test                     (1 Point)                 SURGERY RELATED RISK FACTORS  [ ] Acute myocardial infarction                              (1 Point)                 [ ]  Section                                            (1 Point)  [ ] Congestive heart failure (in the previous month)  (1 Point)                 [ ] Minor surgery                                                 (1 Point)   [ ] Inflammatory bowel disease                             (1 Point)                 [ ] Arthroscopic surgery                                        (2 Points)  [ ] Central venous access                                    (2 Points)                [ ] General surgery lasting more than 45 minutes   (2 Points)       [ ] Stroke (in the previous month)                          (5 Points)               [x ] Elective arthroplasty                                        (5 Points)                                                                                                                                               HEMATOLOGY RELATED FACTORS                                                 TRAUMA RELATED RISK FACTORS  [ ] Prior episodes of VTE                                     (3 Points)                 [ ] Fracture of the hip, pelvis, or leg                       (5 Points)  [ ] Positive family history for VTE                         (3 Points)                 [ ] Acute spinal cord injury (in the previous month)  (5 Points)  [ ] Prothrombin 09275 A                                      (3 Points)                 [ ] Paralysis  (less than 1 month)                          (5 Points)  [ ] Factor V Leiden                                             (3 Points)                 [ ] Multiple Trauma within 1 month                         (5 Points)  [ ] Lupus anticoagulants                                     (3 Points)                                                           [ ] Anticardiolipin antibodies                                (3 Points)                                                       [ ] High homocysteine in the blood                      (3 Points)                                             [ ] Other congenital or acquired thrombophilia       (3 Points)                                                [ ] Heparin induced thrombocytopenia                  (3 Points)                                          Total Score [      6    ]

## 2020-11-25 NOTE — PHYSICAL THERAPY INITIAL EVALUATION ADULT - ADDITIONAL COMMENTS
Patient pain at rest is 4/10, pain with activity 9/10. Per patient, pain is worse with at work, with prolong standing and walking, stair climbing, standing up after prolong sitting, standing up in the morning and with changes in weather. Patient endorses taking Celebrex, Tylenol, and Advil for pain. Home set-up: lives with 12y/o son in private  house with 3 stair steps with R handrail to enter. Bedroom is on 2nd floor with 12 stair steps with L handrails. Bathroom is on 1st floor, Tub/shower combo with fixed shower head, no grab bars, a standard height toilet seat, 3:1 commode will fit over toilet. Patient's sister may stay with after surgery, Patients mother lives locally and will be available for assistance post-op. Patient is currently independent with all ADLs with out use of any devices. Patient is right- handed and drives and doesn't wear glasses. Patient denies falls in past 6 months. Denies buckling.

## 2020-11-25 NOTE — H&P PST ADULT - NSANTHOSAYNRD_GEN_A_CORE
No. ROMIE screening performed.  STOP BANG Legend: 0-2 = LOW Risk; 3-4 = INTERMEDIATE Risk; 5-8 = HIGH Risk

## 2020-11-25 NOTE — OCCUPATIONAL THERAPY INITIAL EVALUATION ADULT - PERTINENT HX OF CURRENT PROBLEM, REHAB EVAL
L hip OA which impacts patients ability to perform functional tasks and transfers/mobility. Patient is scheduled for L ADIS on 12/10/20.

## 2020-11-25 NOTE — PHYSICAL THERAPY INITIAL EVALUATION ADULT - RANGE OF MOTION EXAMINATION, REHAB EVAL
deficits as listed below/bilateral upper extremity ROM was WNL (within normal limits)/Right LE ROM was WNL (within normal limits)

## 2020-11-25 NOTE — OCCUPATIONAL THERAPY INITIAL EVALUATION ADULT - ADDITIONAL COMMENTS
Patient lives with family (Who can assist post op) in a private house with 3 steps to enter with a R rail. Once inside, the patient has 12 steps with a L rail to reach the main floor where the bedroom is. The patients bathroom is on the first floor when entering. The patients bathroom has a tub/shower combination, fixed shower head, standard toilet seat and no grab bars. The patient reports that a 3/1 commode can fit over the toilet at home. The patient ambulates with no device and does not own any device for ambulation. The patient daily pain is a 4/10 at rest and a 9/10 with movement. The patient manages the pain with Tylenol, Celebrex and Advil. The patient has no recent outpatient PT, no recent falls and no buckling noted. The patient does not wear glasses, R handed, Drives and has no hearing impairments.

## 2020-11-25 NOTE — PHYSICAL THERAPY INITIAL EVALUATION ADULT - MODALITIES TREATMENT COMMENTS
30 Seconds Sit to Stand c 19 repetitions, measured for baseline data; One-Leg Stand Test = >25 seconds on left leg

## 2020-11-25 NOTE — PHYSICAL THERAPY INITIAL EVALUATION ADULT - PERTINENT HX OF CURRENT PROBLEM, REHAB EVAL
Patient attends pre-op testing today following consult c Dr. Lancaster due to chronic pain to left hip. Elective Left ADIS is now scheduled in this facility for 12/10/2020. Patient verbalized feeling nauseous after taking prescribed pain medications in past.

## 2020-11-30 PROBLEM — E66.9 OBESITY, UNSPECIFIED: Chronic | Status: ACTIVE | Noted: 2020-11-25

## 2020-12-04 ENCOUNTER — APPOINTMENT (OUTPATIENT)
Dept: INTERNAL MEDICINE | Facility: CLINIC | Age: 35
End: 2020-12-04
Payer: COMMERCIAL

## 2020-12-04 VITALS
HEIGHT: 62 IN | WEIGHT: 187 LBS | BODY MASS INDEX: 34.41 KG/M2 | TEMPERATURE: 97.2 F | OXYGEN SATURATION: 97 % | DIASTOLIC BLOOD PRESSURE: 81 MMHG | HEART RATE: 84 BPM | SYSTOLIC BLOOD PRESSURE: 124 MMHG

## 2020-12-04 DIAGNOSIS — Z51.81 ENCOUNTER FOR THERAPEUTIC DRUG LVL MONITORING: ICD-10-CM

## 2020-12-04 DIAGNOSIS — R76.8 OTHER SPECIFIED ABNORMAL IMMUNOLOGICAL FINDINGS IN SERUM: ICD-10-CM

## 2020-12-04 DIAGNOSIS — Z01.818 ENCOUNTER FOR OTHER PREPROCEDURAL EXAMINATION: ICD-10-CM

## 2020-12-04 PROCEDURE — 99214 OFFICE O/P EST MOD 30 MIN: CPT

## 2020-12-04 PROCEDURE — 99072 ADDL SUPL MATRL&STAF TM PHE: CPT

## 2020-12-07 ENCOUNTER — APPOINTMENT (OUTPATIENT)
Dept: DISASTER EMERGENCY | Facility: CLINIC | Age: 35
End: 2020-12-07

## 2020-12-07 PROBLEM — Z01.818 PREOP TESTING: Status: RESOLVED | Noted: 2020-12-05 | Resolved: 2020-12-07

## 2020-12-07 PROBLEM — R76.8 HEPATITIS C ANTIBODY TEST POSITIVE: Status: RESOLVED | Noted: 2018-01-19 | Resolved: 2020-12-07

## 2020-12-07 PROBLEM — Z51.81 MEDICATION MONITORING ENCOUNTER: Status: RESOLVED | Noted: 2020-11-23 | Resolved: 2020-12-07

## 2020-12-07 NOTE — PHYSICAL EXAM
[No Carotid Bruits] : no carotid bruits [No Varicosities] : no varicosities [No Edema] : there was no peripheral edema [No Joint Swelling] : no joint swelling [Grossly Normal Strength/Tone] : grossly normal strength/tone [Normal] : normal gait, coordination grossly intact, no focal deficits and deep tendon reflexes were 2+ and symmetric [Speech Grossly Normal] : speech grossly normal [Memory Grossly Normal] : memory grossly normal [Normal Affect] : the affect was normal [Alert and Oriented x3] : oriented to person, place, and time [Normal Mood] : the mood was normal [Normal Insight/Judgement] : insight and judgment were intact [de-identified] : +decreased ROM of left hip, generalized tenderness, no erythema, no swelling

## 2020-12-07 NOTE — RESULTS/DATA
[] : results reviewed [ECG Reviewed] : reviewed [Normal] : The 12 - lead ECG is normal [No Acute Ischemia] : No acute ischemia [de-identified] : WNL [de-identified] : WNL [de-identified] : WNL

## 2020-12-07 NOTE — HISTORY OF PRESENT ILLNESS
[No Pertinent Cardiac History] : no history of aortic stenosis, atrial fibrillation, coronary artery disease, recent myocardial infarction, or implantable device/pacemaker [No Pertinent Pulmonary History] : no history of asthma, COPD, sleep apnea, or smoking [No Adverse Anesthesia Reaction] : no adverse anesthesia reaction in self or family member [Chronic Anticoagulation] : no chronic anticoagulation [Chronic Kidney Disease] : no chronic kidney disease [Diabetes] : no diabetes [(Patient denies any chest pain, claudication, dyspnea on exertion, orthopnea, palpitations or syncope)] : Patient denies any chest pain, claudication, dyspnea on exertion, orthopnea, palpitations or syncope [Good (7-10 METs)] : Good (7-10 METs) [FreeTextEntry1] : Left hip replacement  [FreeTextEntry2] : 12/10/2020 [FreeTextEntry3] : Dr. Carlito Lancaster [FreeTextEntry4] : 36 y/o female patient presents today:\par - Pre-operative clearance - patient to undergo left hip replacement due to left hip osteoarthritis/chronic pain, on 12/10/2020 to be performed by Dr. Carlito Lancaster at [facility] \par - pre-op testing/EKG already done reviewed today in office

## 2020-12-07 NOTE — REVIEW OF SYSTEMS
[Joint Pain] : joint pain [Joint Stiffness] : joint stiffness [Joint Swelling] : no joint swelling [Muscle Weakness] : no muscle weakness [Muscle Pain] : no muscle pain [Back Pain] : no back pain [Negative] : Heme/Lymph

## 2020-12-08 LAB — SARS-COV-2 N GENE NPH QL NAA+PROBE: NOT DETECTED

## 2020-12-09 ENCOUNTER — TRANSCRIPTION ENCOUNTER (OUTPATIENT)
Age: 35
End: 2020-12-09

## 2020-12-10 ENCOUNTER — RESULT REVIEW (OUTPATIENT)
Age: 35
End: 2020-12-10

## 2020-12-10 ENCOUNTER — INPATIENT (INPATIENT)
Facility: HOSPITAL | Age: 35
LOS: 0 days | Discharge: HOME HEALTH | End: 2020-12-11
Attending: ORTHOPAEDIC SURGERY | Admitting: ORTHOPAEDIC SURGERY
Payer: MEDICARE

## 2020-12-10 ENCOUNTER — TRANSCRIPTION ENCOUNTER (OUTPATIENT)
Age: 35
End: 2020-12-10

## 2020-12-10 VITALS
HEART RATE: 97 BPM | DIASTOLIC BLOOD PRESSURE: 88 MMHG | SYSTOLIC BLOOD PRESSURE: 132 MMHG | HEIGHT: 60 IN | RESPIRATION RATE: 12 BRPM | OXYGEN SATURATION: 100 % | WEIGHT: 198.2 LBS | TEMPERATURE: 98 F

## 2020-12-10 LAB
ANION GAP SERPL CALC-SCNC: 6 MMOL/L — SIGNIFICANT CHANGE UP (ref 5–17)
BLD GP AB SCN SERPL QL: SIGNIFICANT CHANGE UP
BUN SERPL-MCNC: 8 MG/DL — SIGNIFICANT CHANGE UP (ref 7–23)
CALCIUM SERPL-MCNC: 9 MG/DL — SIGNIFICANT CHANGE UP (ref 8.5–10.1)
CHLORIDE SERPL-SCNC: 108 MMOL/L — SIGNIFICANT CHANGE UP (ref 96–108)
CO2 SERPL-SCNC: 24 MMOL/L — SIGNIFICANT CHANGE UP (ref 22–31)
CREAT SERPL-MCNC: 0.8 MG/DL — SIGNIFICANT CHANGE UP (ref 0.5–1.3)
GLUCOSE SERPL-MCNC: 103 MG/DL — HIGH (ref 70–99)
HCG UR QL: NEGATIVE — SIGNIFICANT CHANGE UP
HCT VFR BLD CALC: 39.1 % — SIGNIFICANT CHANGE UP (ref 34.5–45)
HGB BLD-MCNC: 13.8 G/DL — SIGNIFICANT CHANGE UP (ref 11.5–15.5)
MCHC RBC-ENTMCNC: 31.9 PG — SIGNIFICANT CHANGE UP (ref 27–34)
MCHC RBC-ENTMCNC: 35.3 GM/DL — SIGNIFICANT CHANGE UP (ref 32–36)
MCV RBC AUTO: 90.5 FL — SIGNIFICANT CHANGE UP (ref 80–100)
NRBC # BLD: 0 /100 WBCS — SIGNIFICANT CHANGE UP (ref 0–0)
PLATELET # BLD AUTO: 219 K/UL — SIGNIFICANT CHANGE UP (ref 150–400)
POTASSIUM SERPL-MCNC: 4 MMOL/L — SIGNIFICANT CHANGE UP (ref 3.5–5.3)
POTASSIUM SERPL-SCNC: 4 MMOL/L — SIGNIFICANT CHANGE UP (ref 3.5–5.3)
RBC # BLD: 4.32 M/UL — SIGNIFICANT CHANGE UP (ref 3.8–5.2)
RBC # FLD: 12.9 % — SIGNIFICANT CHANGE UP (ref 10.3–14.5)
SODIUM SERPL-SCNC: 138 MMOL/L — SIGNIFICANT CHANGE UP (ref 135–145)
WBC # BLD: 10.99 K/UL — HIGH (ref 3.8–10.5)
WBC # FLD AUTO: 10.99 K/UL — HIGH (ref 3.8–10.5)

## 2020-12-10 PROCEDURE — 88305 TISSUE EXAM BY PATHOLOGIST: CPT | Mod: 26

## 2020-12-10 PROCEDURE — 72170 X-RAY EXAM OF PELVIS: CPT | Mod: 26

## 2020-12-10 PROCEDURE — 88311 DECALCIFY TISSUE: CPT | Mod: 26

## 2020-12-10 RX ORDER — ASCORBIC ACID 60 MG
500 TABLET,CHEWABLE ORAL
Refills: 0 | Status: DISCONTINUED | OUTPATIENT
Start: 2020-12-10 | End: 2020-12-11

## 2020-12-10 RX ORDER — MAGNESIUM HYDROXIDE 400 MG/1
30 TABLET, CHEWABLE ORAL DAILY
Refills: 0 | Status: DISCONTINUED | OUTPATIENT
Start: 2020-12-10 | End: 2020-12-11

## 2020-12-10 RX ORDER — ONDANSETRON 8 MG/1
4 TABLET, FILM COATED ORAL EVERY 6 HOURS
Refills: 0 | Status: DISCONTINUED | OUTPATIENT
Start: 2020-12-10 | End: 2020-12-11

## 2020-12-10 RX ORDER — CELECOXIB 200 MG/1
200 CAPSULE ORAL ONCE
Refills: 0 | Status: COMPLETED | OUTPATIENT
Start: 2020-12-10 | End: 2020-12-10

## 2020-12-10 RX ORDER — BENZOCAINE AND MENTHOL 5; 1 G/100ML; G/100ML
1 LIQUID ORAL EVERY 4 HOURS
Refills: 0 | Status: DISCONTINUED | OUTPATIENT
Start: 2020-12-10 | End: 2020-12-11

## 2020-12-10 RX ORDER — TRANEXAMIC ACID 100 MG/ML
1000 INJECTION, SOLUTION INTRAVENOUS ONCE
Refills: 0 | Status: COMPLETED | OUTPATIENT
Start: 2020-12-10 | End: 2020-12-10

## 2020-12-10 RX ORDER — SODIUM CHLORIDE 9 MG/ML
1000 INJECTION, SOLUTION INTRAVENOUS
Refills: 0 | Status: DISCONTINUED | OUTPATIENT
Start: 2020-12-10 | End: 2020-12-11

## 2020-12-10 RX ORDER — PANTOPRAZOLE SODIUM 20 MG/1
40 TABLET, DELAYED RELEASE ORAL
Refills: 0 | Status: DISCONTINUED | OUTPATIENT
Start: 2020-12-10 | End: 2020-12-11

## 2020-12-10 RX ORDER — TOPIRAMATE 25 MG
25 TABLET ORAL DAILY
Refills: 0 | Status: DISCONTINUED | OUTPATIENT
Start: 2020-12-10 | End: 2020-12-11

## 2020-12-10 RX ORDER — ACETAMINOPHEN 500 MG
650 TABLET ORAL EVERY 6 HOURS
Refills: 0 | Status: DISCONTINUED | OUTPATIENT
Start: 2020-12-10 | End: 2020-12-11

## 2020-12-10 RX ORDER — HYDROMORPHONE HYDROCHLORIDE 2 MG/ML
0.5 INJECTION INTRAMUSCULAR; INTRAVENOUS; SUBCUTANEOUS
Refills: 0 | Status: DISCONTINUED | OUTPATIENT
Start: 2020-12-10 | End: 2020-12-10

## 2020-12-10 RX ORDER — SENNA PLUS 8.6 MG/1
2 TABLET ORAL AT BEDTIME
Refills: 0 | Status: DISCONTINUED | OUTPATIENT
Start: 2020-12-10 | End: 2020-12-11

## 2020-12-10 RX ORDER — DEXAMETHASONE 0.5 MG/5ML
10 ELIXIR ORAL ONCE
Refills: 0 | Status: COMPLETED | OUTPATIENT
Start: 2020-12-11 | End: 2020-12-11

## 2020-12-10 RX ORDER — DIPHENHYDRAMINE HCL 50 MG
50 CAPSULE ORAL AT BEDTIME
Refills: 0 | Status: DISCONTINUED | OUTPATIENT
Start: 2020-12-10 | End: 2020-12-11

## 2020-12-10 RX ORDER — KETOROLAC TROMETHAMINE 30 MG/ML
30 SYRINGE (ML) INJECTION EVERY 8 HOURS
Refills: 0 | Status: DISCONTINUED | OUTPATIENT
Start: 2020-12-10 | End: 2020-12-11

## 2020-12-10 RX ORDER — CELECOXIB 200 MG/1
200 CAPSULE ORAL
Refills: 0 | Status: DISCONTINUED | OUTPATIENT
Start: 2020-12-10 | End: 2020-12-11

## 2020-12-10 RX ORDER — LANOLIN ALCOHOL/MO/W.PET/CERES
3 CREAM (GRAM) TOPICAL AT BEDTIME
Refills: 0 | Status: DISCONTINUED | OUTPATIENT
Start: 2020-12-10 | End: 2020-12-11

## 2020-12-10 RX ORDER — ACETAMINOPHEN 500 MG
650 TABLET ORAL ONCE
Refills: 0 | Status: COMPLETED | OUTPATIENT
Start: 2020-12-10 | End: 2020-12-10

## 2020-12-10 RX ORDER — TOPIRAMATE 25 MG
1 TABLET ORAL
Qty: 0 | Refills: 0 | DISCHARGE

## 2020-12-10 RX ORDER — PHENTERMINE HCL 30 MG
1 CAPSULE ORAL
Qty: 0 | Refills: 0 | DISCHARGE

## 2020-12-10 RX ORDER — SODIUM CHLORIDE 9 MG/ML
500 INJECTION, SOLUTION INTRAVENOUS ONCE
Refills: 0 | Status: COMPLETED | OUTPATIENT
Start: 2020-12-10 | End: 2020-12-10

## 2020-12-10 RX ORDER — POLYETHYLENE GLYCOL 3350 17 G/17G
17 POWDER, FOR SOLUTION ORAL DAILY
Refills: 0 | Status: DISCONTINUED | OUTPATIENT
Start: 2020-12-10 | End: 2020-12-11

## 2020-12-10 RX ORDER — ONDANSETRON 8 MG/1
4 TABLET, FILM COATED ORAL ONCE
Refills: 0 | Status: DISCONTINUED | OUTPATIENT
Start: 2020-12-10 | End: 2020-12-10

## 2020-12-10 RX ORDER — OXYCODONE HYDROCHLORIDE 5 MG/1
5 TABLET ORAL EVERY 4 HOURS
Refills: 0 | Status: COMPLETED | OUTPATIENT
Start: 2020-12-10 | End: 2020-12-11

## 2020-12-10 RX ORDER — HYDROMORPHONE HYDROCHLORIDE 2 MG/ML
0.5 INJECTION INTRAMUSCULAR; INTRAVENOUS; SUBCUTANEOUS EVERY 4 HOURS
Refills: 0 | Status: DISCONTINUED | OUTPATIENT
Start: 2020-12-10 | End: 2020-12-11

## 2020-12-10 RX ORDER — OXYCODONE HYDROCHLORIDE 5 MG/1
10 TABLET ORAL EVERY 4 HOURS
Refills: 0 | Status: DISCONTINUED | OUTPATIENT
Start: 2020-12-10 | End: 2020-12-11

## 2020-12-10 RX ORDER — CEFAZOLIN SODIUM 1 G
2000 VIAL (EA) INJECTION EVERY 8 HOURS
Refills: 0 | Status: COMPLETED | OUTPATIENT
Start: 2020-12-10 | End: 2020-12-11

## 2020-12-10 RX ORDER — SODIUM CHLORIDE 9 MG/ML
1000 INJECTION, SOLUTION INTRAVENOUS
Refills: 0 | Status: DISCONTINUED | OUTPATIENT
Start: 2020-12-10 | End: 2020-12-10

## 2020-12-10 RX ORDER — OXYCODONE HYDROCHLORIDE 5 MG/1
5 TABLET ORAL EVERY 4 HOURS
Refills: 0 | Status: DISCONTINUED | OUTPATIENT
Start: 2020-12-10 | End: 2020-12-11

## 2020-12-10 RX ORDER — ASPIRIN/CALCIUM CARB/MAGNESIUM 324 MG
81 TABLET ORAL
Refills: 0 | Status: DISCONTINUED | OUTPATIENT
Start: 2020-12-11 | End: 2020-12-11

## 2020-12-10 RX ADMIN — SODIUM CHLORIDE 125 MILLILITER(S): 9 INJECTION, SOLUTION INTRAVENOUS at 18:24

## 2020-12-10 RX ADMIN — Medication 100 MILLIGRAM(S): at 23:01

## 2020-12-10 RX ADMIN — SODIUM CHLORIDE 75 MILLILITER(S): 9 INJECTION, SOLUTION INTRAVENOUS at 16:26

## 2020-12-10 RX ADMIN — Medication 30 MILLIGRAM(S): at 20:52

## 2020-12-10 RX ADMIN — SODIUM CHLORIDE 1000 MILLILITER(S): 9 INJECTION, SOLUTION INTRAVENOUS at 20:50

## 2020-12-10 RX ADMIN — CELECOXIB 200 MILLIGRAM(S): 200 CAPSULE ORAL at 13:14

## 2020-12-10 RX ADMIN — OXYCODONE HYDROCHLORIDE 5 MILLIGRAM(S): 5 TABLET ORAL at 23:00

## 2020-12-10 RX ADMIN — Medication 30 MILLIGRAM(S): at 21:15

## 2020-12-10 RX ADMIN — Medication 50 MILLIGRAM(S): at 23:00

## 2020-12-10 RX ADMIN — Medication 650 MILLIGRAM(S): at 13:14

## 2020-12-10 RX ADMIN — TRANEXAMIC ACID 220 MILLIGRAM(S): 100 INJECTION, SOLUTION INTRAVENOUS at 17:00

## 2020-12-10 NOTE — DISCHARGE NOTE PROVIDER - CARE PROVIDER_API CALL
ALEAH SWEET  [id number]  [address] [facility] [address]  [place] [address]  Phone: [phone]  Fax: [phone]  Follow Up Time:    Carlito Lancaster)  Orthopaedic Surgery  [address] [place] [address]  Phone: [phone]  Fax: [phone]  Follow Up Time:

## 2020-12-10 NOTE — OCCUPATIONAL THERAPY INITIAL EVALUATION ADULT - RANGE OF MOTION EXAMINATION, LOWER EXTREMITY
LLE AROM grossly decreased s/p left ADIS/Right LE Active ROM was WNL(within normal limits)/Right LE Passive ROM was WNL (within normal limits)

## 2020-12-10 NOTE — OCCUPATIONAL THERAPY INITIAL EVALUATION ADULT - ADDITIONAL COMMENTS
(0) independent
Pre-op confirmed: Patient lives with family  in a private house with 3 steps to enter with a R rail. Once inside, the patient has 12 steps with a L rail to reach the main floor where the bedroom is. The patients bathroom is on the first floor when entering. The patients bathroom has a tub/shower combination, fixed shower head, standard toilet seat and no grab bars. The patient reports that a 3/1 commode can fit over the toilet at home. The patient ambulates with no device and does not own any device for ambulation. The patient does not wear glasses, R handed, Drives and has no hearing impairments.

## 2020-12-10 NOTE — DISCHARGE NOTE PROVIDER - NSDCMRMEDTOKEN_GEN_ALL_CORE_FT
CeleBREX: 1 tab(s) orally once a day  Multiple Vitamins oral capsule: 1 cap(s) orally once a day  phentermine 15 mg oral capsule: 1 cap(s) orally once a day (in the morning)  topiramate 25 mg oral tablet: 1 tab(s) orally once a day   acetaminophen 325 mg oral tablet: 2 tab(s) orally every 6 hours, As needed,   ascorbic acid 500 mg oral tablet: 1 tab(s) orally 2 times a day  aspirin 81 mg oral delayed release tablet: 1 tab(s) orally every 12 hours for DVT prophylaxis   celecoxib 200 mg oral capsule: 1 cap(s) orally 2 times a day  Multiple Vitamins oral tablet: 1 tab(s) orally once a day  oxyCODONE 5 mg oral tablet: 1-2 tabs orally every 4 hours prn pain MDD 12  pantoprazole 40 mg oral delayed release tablet: 1 tab(s) orally once a day (before a meal)  Please take this medication while taking aspirin   phentermine 15 mg oral capsule: 1 cap(s) orally once a day (in the morning)  senna oral tablet: 2 tab(s) orally once a day (at bedtime)  Please take this medication while taking pain medications   topiramate 25 mg oral tablet: 1 tab(s) orally once a day

## 2020-12-10 NOTE — DISCHARGE NOTE PROVIDER - HOSPITAL COURSE
35yFemale with history of Left Hip Osteoarthritis presenting for Left ADIS by Dr. Lancaster on 12/10/20. Risk and benefits of surgery were explained to the patient. The patient understood and agreed to proceed with surgery. Patient underwent the procedure with no intraoperative complications. Pt was brought in stable condition to the PACU. Once stable in PACU, pt was brought to the floor. During hospital stay pt was followed by Medicine during this admission. Pt had an uneventful hospital course. Pt is stable for discharge to [rehab/home] on POD#[ ] 35yFemale with history of Left Hip Osteoarthritis presenting for Left ADIS by Dr. Lancaster on 12/10/20. Risk and benefits of surgery were explained to the patient. The patient understood and agreed to proceed with surgery. Patient underwent the procedure with no intraoperative complications. Pt was brought in stable condition to the PACU. Once stable in PACU, pt was brought to the floor. During hospital stay pt was followed by Medicine during this admission. Pt had an uneventful hospital course. Pt is stable for discharge to home on POD# 1

## 2020-12-10 NOTE — PHYSICAL THERAPY INITIAL EVALUATION ADULT - ADDITIONAL COMMENTS
Verified postoperatively, Patient pain at rest is 4/10, pain with activity 9/10. Per patient, pain is worse with at work, with prolong standing and walking, stair climbing, standing up after prolong sitting, standing up in the morning and with changes in weather. Patient endorses taking Celebrex, Tylenol, and Advil for pain. Home set-up: lives with 12y/o son in private  house with 3 stair steps with R handrail to enter. Bedroom is on 2nd floor with 12 stair steps with L handrails. Bathroom is on 1st floor, Tub/shower combo with fixed shower head, no grab bars, a standard height toilet seat, 3:1 commode will fit over toilet. Patient's sister may stay with after surgery, Patients mother lives locally and will be available for assistance post-op. Patient is currently independent with all ADLs with out use of any devices. Patient is right- handed and drives and doesn't wear glasses. Patient denies falls in past 6 months. Denies buckling.

## 2020-12-10 NOTE — CONSULT NOTE ADULT - SUBJECTIVE AND OBJECTIVE BOX
JOE DELUCA is a 35y Female s/p LEFT TOTAL HIP ARTHROPLASTY      w/ h/o Obesity      denies any chest pain shortness of breath palpitation dizziness lightheadedness nausea vomiting fever or chills    No significant past surgical history        SH: doesnot smoke or drink at this time    No Known Allergies    acetaminophen   Tablet .. 650 milliGRAM(s) Oral every 6 hours PRN  aluminum hydroxide/magnesium hydroxide/simethicone Suspension 30 milliLiter(s) Oral four times a day PRN  ascorbic acid 500 milliGRAM(s) Oral two times a day  benzocaine 15 mG/menthol 3.6 mG (Sugar-Free) Lozenge 1 Lozenge Oral every 4 hours PRN  ceFAZolin   IVPB 2000 milliGRAM(s) IV Intermittent every 8 hours  celecoxib 200 milliGRAM(s) Oral two times a day  diphenhydrAMINE 50 milliGRAM(s) Oral at bedtime  HYDROmorphone  Injectable 0.5 milliGRAM(s) IV Push every 4 hours PRN  ketorolac   Injectable 30 milliGRAM(s) IV Push every 8 hours  lactated ringers. 1000 milliLiter(s) IV Continuous <Continuous>  magnesium hydroxide Suspension 30 milliLiter(s) Oral daily PRN  melatonin 3 milliGRAM(s) Oral at bedtime PRN  multivitamin 1 Tablet(s) Oral daily  ondansetron Injectable 4 milliGRAM(s) IV Push every 6 hours PRN  oxyCODONE    IR 5 milliGRAM(s) Oral every 4 hours PRN  oxyCODONE    IR 10 milliGRAM(s) Oral every 4 hours PRN  oxyCODONE    IR 5 milliGRAM(s) Oral every 4 hours  pantoprazole    Tablet 40 milliGRAM(s) Oral before breakfast  polyethylene glycol 3350 17 Gram(s) Oral daily  senna 2 Tablet(s) Oral at bedtime PRN  sodium chloride 0.9% lock flush 3 milliLiter(s) IV Push every 8 hours  topiramate 25 milliGRAM(s) Oral daily    T(C): 36.9 (12-10-20 @ 19:06), Max: 36.9 (12-10-20 @ 13:16)  HR: 95 (12-10-20 @ 20:39) (69 - 97)  BP: 114/81 (12-10-20 @ 20:39) (98/62 - 132/88)  RR: 18 (12-10-20 @ 20:39) (12 - 20)  SpO2: 99% (12-10-20 @ 20:39) (96% - 100%)  HEENT unremarkable  neck no JVD or bruit  heart normal S1 S2 RRR no gallops or rubs  chest clear to auscultation  abd sof nontender non distended +bs  ext no calf tenderness    A/P   DVT PX  pain control  bowel regimen   wound care as per ortho  GI PX  antiemetics prn  incentive spirometer

## 2020-12-10 NOTE — PHYSICAL THERAPY INITIAL EVALUATION ADULT - CRITERIA FOR SKILLED THERAPEUTIC INTERVENTIONS
functional limitations in following categories/impairments found/therapy frequency/anticipated equipment needs at discharge/anticipated discharge recommendation/rehab potential/Home with home PT, PArtial stair eval pending, Pt needs Matthias RW, 3:1 commode, PT gave Cane./risk reduction/prevention

## 2020-12-10 NOTE — PHYSICAL THERAPY INITIAL EVALUATION ADULT - ASSISTIVE DEVICE FOR STAIR TRANSFER, REHAB EVAL
straight cane/right rail up/while on the top of the stairs pt felt dizzy, slowly made her to step down 3 steps , did sit in the recliner, hence other 3 steps with left rail up & down facing the rails sideways was unable to be performed, BP was low

## 2020-12-10 NOTE — OCCUPATIONAL THERAPY INITIAL EVALUATION ADULT - RANGE OF MOTION, PT EVAL
Patient will increase AROM in left hip to WFL in order to perform toilet transfer independently within 3 weeks

## 2020-12-10 NOTE — DISCHARGE NOTE PROVIDER - NSDCFUADDINST_GEN_ALL_CORE_FT
Keep Prineo Dressing Clean, Dry and Intact. May shower with Prineo Dressing. Please do not scrub, soak, peel or pick at the prineo dressing. No creams, lotions, or oils over dressing. May shower and let water run over incision, no baths. Pat dry once out of shower. Dressing to be removed in office at follow up visit in 2 weeks.

## 2020-12-10 NOTE — PHYSICAL THERAPY INITIAL EVALUATION ADULT - ASR EQUIP NEEDS DISCH PT EVAL
PArtial stair eval pending, Pt needs Matthias RW, 3:1 commode, PT gave Cane/3:1 commode/rolling walker (5 inch wheels)

## 2020-12-10 NOTE — PROGRESS NOTE ADULT - SUBJECTIVE AND OBJECTIVE BOX
Post-op Check  POD#0 S/P Left ADIS  35yFemale Patient seen and examined, Pain controlled  Patient Denies SOB, CP, N/V/D       PE: Left Hip/LE: Dressing C/D/I, Sensation/motor intact, DP 2+, FROM ankle/toes   B/L LE: Skin intact. +ROM hip/knee/ankle/toes. Ankle Dorsi/plantarflexion: 5/5. Calf: soft, compressible and nontender. DP/PT 2+ NVI                          13.8   10.99 )-----------( 219      ( 10 Dec 2020 16:59 )             39.1       12-10    138  |  108  |  8   ----------------------------<  103<H>  4.0   |  24  |  0.80    Ca    9.0      10 Dec 2020 16:59          A: As above   P: Pain Control       DVT Prophylaxis      Incentive spirometry      Total hip precautions Reviewed       PT WBAT LLE      Isometric exercises      Discharge Planning      All the above discussed and understood by pt       Ortho to F/U

## 2020-12-10 NOTE — OCCUPATIONAL THERAPY INITIAL EVALUATION ADULT - GENERAL OBSERVATIONS, REHAB EVAL
Patient encountered supine in bed, NAD, AXO@4, +pneumatic TEDs, +abduction pillow, WBAT LLE, c/o pain  in left hip 7/10 at rest and with movement s/p left ADIS, cooperative and pleasant with PT Hawa bedside.

## 2020-12-10 NOTE — DISCHARGE NOTE PROVIDER - NSDCHHHOMEBOUND_GEN_ALL_CORE
Problem: Respiratory Impairment - Respiratory Therapy 253  Intervention: Inhaled gas administration  Intervention Status  Done  Intervention: Respiratory support devices  Intervention Status  Done  Intervention: Inhaled medication delivery  Intervention Status  Done         Ataxic gait

## 2020-12-10 NOTE — PHYSICAL THERAPY INITIAL EVALUATION ADULT - PLANNED THERAPY INTERVENTIONS, PT EVAL
strengthening/balance training/bed mobility training/transfer training/gait training/Pt will be able to negotiate 3 steps using right rail up left sided cane independently in 2 to 3 days

## 2020-12-11 ENCOUNTER — TRANSCRIPTION ENCOUNTER (OUTPATIENT)
Age: 35
End: 2020-12-11

## 2020-12-11 VITALS
RESPIRATION RATE: 18 BRPM | HEART RATE: 85 BPM | SYSTOLIC BLOOD PRESSURE: 111 MMHG | OXYGEN SATURATION: 98 % | DIASTOLIC BLOOD PRESSURE: 63 MMHG

## 2020-12-11 LAB
ANION GAP SERPL CALC-SCNC: 5 MMOL/L — SIGNIFICANT CHANGE UP (ref 5–17)
BUN SERPL-MCNC: 10 MG/DL — SIGNIFICANT CHANGE UP (ref 7–23)
CALCIUM SERPL-MCNC: 8.7 MG/DL — SIGNIFICANT CHANGE UP (ref 8.5–10.1)
CHLORIDE SERPL-SCNC: 107 MMOL/L — SIGNIFICANT CHANGE UP (ref 96–108)
CO2 SERPL-SCNC: 27 MMOL/L — SIGNIFICANT CHANGE UP (ref 22–31)
CREAT SERPL-MCNC: 0.71 MG/DL — SIGNIFICANT CHANGE UP (ref 0.5–1.3)
GLUCOSE SERPL-MCNC: 127 MG/DL — HIGH (ref 70–99)
HCT VFR BLD CALC: 35.2 % — SIGNIFICANT CHANGE UP (ref 34.5–45)
HGB BLD-MCNC: 12 G/DL — SIGNIFICANT CHANGE UP (ref 11.5–15.5)
MCHC RBC-ENTMCNC: 31.7 PG — SIGNIFICANT CHANGE UP (ref 27–34)
MCHC RBC-ENTMCNC: 34.1 GM/DL — SIGNIFICANT CHANGE UP (ref 32–36)
MCV RBC AUTO: 92.9 FL — SIGNIFICANT CHANGE UP (ref 80–100)
NRBC # BLD: 0 /100 WBCS — SIGNIFICANT CHANGE UP (ref 0–0)
PLATELET # BLD AUTO: 202 K/UL — SIGNIFICANT CHANGE UP (ref 150–400)
POTASSIUM SERPL-MCNC: 3.7 MMOL/L — SIGNIFICANT CHANGE UP (ref 3.5–5.3)
POTASSIUM SERPL-SCNC: 3.7 MMOL/L — SIGNIFICANT CHANGE UP (ref 3.5–5.3)
RBC # BLD: 3.79 M/UL — LOW (ref 3.8–5.2)
RBC # FLD: 13 % — SIGNIFICANT CHANGE UP (ref 10.3–14.5)
SODIUM SERPL-SCNC: 139 MMOL/L — SIGNIFICANT CHANGE UP (ref 135–145)
WBC # BLD: 11.95 K/UL — HIGH (ref 3.8–10.5)
WBC # FLD AUTO: 11.95 K/UL — HIGH (ref 3.8–10.5)

## 2020-12-11 RX ORDER — OXYCODONE HYDROCHLORIDE 5 MG/1
1 TABLET ORAL
Qty: 60 | Refills: 0
Start: 2020-12-11 | End: 2020-12-15

## 2020-12-11 RX ORDER — PANTOPRAZOLE SODIUM 20 MG/1
1 TABLET, DELAYED RELEASE ORAL
Qty: 35 | Refills: 0
Start: 2020-12-11 | End: 2021-01-14

## 2020-12-11 RX ORDER — CELECOXIB 200 MG/1
1 CAPSULE ORAL
Qty: 60 | Refills: 0
Start: 2020-12-11 | End: 2021-01-09

## 2020-12-11 RX ORDER — SENNA PLUS 8.6 MG/1
2 TABLET ORAL
Qty: 0 | Refills: 0 | DISCHARGE
Start: 2020-12-11

## 2020-12-11 RX ORDER — ASPIRIN/CALCIUM CARB/MAGNESIUM 324 MG
1 TABLET ORAL
Qty: 70 | Refills: 0
Start: 2020-12-11 | End: 2021-01-14

## 2020-12-11 RX ORDER — CELECOXIB 200 MG/1
1 CAPSULE ORAL
Qty: 0 | Refills: 0 | DISCHARGE

## 2020-12-11 RX ORDER — ASCORBIC ACID 60 MG
1 TABLET,CHEWABLE ORAL
Qty: 0 | Refills: 0 | DISCHARGE
Start: 2020-12-11

## 2020-12-11 RX ORDER — ACETAMINOPHEN 500 MG
2 TABLET ORAL
Qty: 0 | Refills: 0 | DISCHARGE
Start: 2020-12-11

## 2020-12-11 RX ADMIN — Medication 3 MILLIGRAM(S): at 01:18

## 2020-12-11 RX ADMIN — CELECOXIB 200 MILLIGRAM(S): 200 CAPSULE ORAL at 18:00

## 2020-12-11 RX ADMIN — Medication 100 MILLIGRAM(S): at 05:29

## 2020-12-11 RX ADMIN — Medication 30 MILLIGRAM(S): at 04:25

## 2020-12-11 RX ADMIN — PANTOPRAZOLE SODIUM 40 MILLIGRAM(S): 20 TABLET, DELAYED RELEASE ORAL at 05:31

## 2020-12-11 RX ADMIN — OXYCODONE HYDROCHLORIDE 5 MILLIGRAM(S): 5 TABLET ORAL at 10:57

## 2020-12-11 RX ADMIN — Medication 81 MILLIGRAM(S): at 05:30

## 2020-12-11 RX ADMIN — Medication 1 TABLET(S): at 11:23

## 2020-12-11 RX ADMIN — Medication 81 MILLIGRAM(S): at 17:27

## 2020-12-11 RX ADMIN — Medication 30 MILLIGRAM(S): at 11:41

## 2020-12-11 RX ADMIN — OXYCODONE HYDROCHLORIDE 10 MILLIGRAM(S): 5 TABLET ORAL at 01:17

## 2020-12-11 RX ADMIN — OXYCODONE HYDROCHLORIDE 10 MILLIGRAM(S): 5 TABLET ORAL at 02:17

## 2020-12-11 RX ADMIN — Medication 30 MILLIGRAM(S): at 04:07

## 2020-12-11 RX ADMIN — Medication 30 MILLIGRAM(S): at 11:23

## 2020-12-11 RX ADMIN — Medication 102 MILLIGRAM(S): at 05:30

## 2020-12-11 RX ADMIN — OXYCODONE HYDROCHLORIDE 5 MILLIGRAM(S): 5 TABLET ORAL at 15:16

## 2020-12-11 RX ADMIN — Medication 500 MILLIGRAM(S): at 17:27

## 2020-12-11 RX ADMIN — CELECOXIB 200 MILLIGRAM(S): 200 CAPSULE ORAL at 05:30

## 2020-12-11 RX ADMIN — Medication 25 MILLIGRAM(S): at 11:23

## 2020-12-11 RX ADMIN — Medication 500 MILLIGRAM(S): at 05:30

## 2020-12-11 RX ADMIN — CELECOXIB 200 MILLIGRAM(S): 200 CAPSULE ORAL at 17:26

## 2020-12-11 RX ADMIN — OXYCODONE HYDROCHLORIDE 5 MILLIGRAM(S): 5 TABLET ORAL at 05:31

## 2020-12-11 RX ADMIN — OXYCODONE HYDROCHLORIDE 5 MILLIGRAM(S): 5 TABLET ORAL at 14:36

## 2020-12-11 RX ADMIN — POLYETHYLENE GLYCOL 3350 17 GRAM(S): 17 POWDER, FOR SOLUTION ORAL at 11:23

## 2020-12-11 RX ADMIN — OXYCODONE HYDROCHLORIDE 5 MILLIGRAM(S): 5 TABLET ORAL at 06:30

## 2020-12-11 RX ADMIN — CELECOXIB 200 MILLIGRAM(S): 200 CAPSULE ORAL at 06:30

## 2020-12-11 RX ADMIN — OXYCODONE HYDROCHLORIDE 5 MILLIGRAM(S): 5 TABLET ORAL at 00:00

## 2020-12-11 RX ADMIN — OXYCODONE HYDROCHLORIDE 5 MILLIGRAM(S): 5 TABLET ORAL at 11:30

## 2020-12-11 RX ADMIN — SODIUM CHLORIDE 3 MILLILITER(S): 9 INJECTION INTRAMUSCULAR; INTRAVENOUS; SUBCUTANEOUS at 14:29

## 2020-12-11 NOTE — PROGRESS NOTE ADULT - SUBJECTIVE AND OBJECTIVE BOX
35y Female s/p L ADIS. Pt seen and examined in NAD. Pain controlled. Pt denies any new complaints. Pt denies CP/SOB/N/V/D/numbness/tingling/bowel or bladder dysfunction.     PE:   Neuro: AAOX3  LLE: dressing in place c/d/i  B/L UE: Skin intact. +ROM shoulder/elbow/wrist/fingers. +ok/thumbsup/fingercross signs.  strength: 5/5.  RP2+ NVI.   B/L LE: Skin intact. +ROM hip/knee/ankle/toes. Ankle Dorsi/plantarflexion: 5/5. Calf: soft, compressible and nontender. DP 2+ NVI.                             12.0   11.95 )-----------( 202      ( 11 Dec 2020 06:51 )             35.2       12-11    139  |  107  |  10  ----------------------------<  127<H>  3.7   |  27  |  0.71    Ca    8.7      11 Dec 2020 06:51          A/P: 35yFemale s/p L ADIS      Pain controlled  DVT ppx: SCDs   Wound care, Isometric exercises, incentive spirometry   Medical consult appreciated  Discharge: planning   All the above discussed and understood by pt

## 2020-12-11 NOTE — PROGRESS NOTE ADULT - SUBJECTIVE AND OBJECTIVE BOX
JOE DELUCA is a 35y Female s/p LEFT TOTAL HIP ARTHROPLASTY        denies any chest pain shortness of breath palpitation dizziness lightheadedness nausea vomiting fever or chills    T(C): 36.3 (12-11-20 @ 05:00), Max: 37.2 (12-11-20 @ 01:00)  HR: 81 (12-11-20 @ 05:00) (69 - 97)  BP: 107/70 (12-11-20 @ 05:00) (98/62 - 132/88)  RR: 17 (12-11-20 @ 05:00) (12 - 20)  SpO2: 97% (12-11-20 @ 05:00) (96% - 100%)  no jvd/bruit  s1 s2 rrr  cta  s/nt/nd  no calf tend                        12.0   11.95 )-----------( 202      ( 11 Dec 2020 06:51 )             35.2   12-11    139  |  107  |  10  ----------------------------<  127<H>  3.7   |  27  |  0.71    Ca    8.7      11 Dec 2020 06:51        cont dvt px  pain control  bowel regimen  antiemetics  incentive spirometer

## 2020-12-11 NOTE — DISCHARGE NOTE NURSING/CASE MANAGEMENT/SOCIAL WORK - PATIENT PORTAL LINK FT
You can access the FollowMyHealth Patient Portal offered by [facility] by registering at the following website: [email or web address]. By joining [facility]’s FollowMyHealth portal, you will also be able to view your health information using other applications (apps) compatible with our system.

## 2020-12-14 ENCOUNTER — NON-APPOINTMENT (OUTPATIENT)
Age: 35
End: 2020-12-14

## 2020-12-15 DIAGNOSIS — E66.9 OBESITY, UNSPECIFIED: ICD-10-CM

## 2020-12-15 DIAGNOSIS — Z79.1 LONG TERM (CURRENT) USE OF NON-STEROIDAL ANTI-INFLAMMATORIES (NSAID): ICD-10-CM

## 2020-12-15 DIAGNOSIS — M16.12 UNILATERAL PRIMARY OSTEOARTHRITIS, LEFT HIP: ICD-10-CM

## 2020-12-15 LAB — SURGICAL PATHOLOGY STUDY: SIGNIFICANT CHANGE UP

## 2020-12-18 ENCOUNTER — APPOINTMENT (OUTPATIENT)
Dept: INTERNAL MEDICINE | Facility: CLINIC | Age: 35
End: 2020-12-18
Payer: COMMERCIAL

## 2020-12-18 DIAGNOSIS — Z96.642 PRESENCE OF LEFT ARTIFICIAL HIP JOINT: ICD-10-CM

## 2020-12-18 DIAGNOSIS — Z01.818 ENCOUNTER FOR OTHER PREPROCEDURAL EXAMINATION: ICD-10-CM

## 2020-12-18 DIAGNOSIS — M16.12 UNILATERAL PRIMARY OSTEOARTHRITIS, LEFT HIP: ICD-10-CM

## 2020-12-18 DIAGNOSIS — Z72.821 INADEQUATE SLEEP HYGIENE: ICD-10-CM

## 2020-12-18 PROCEDURE — 99213 OFFICE O/P EST LOW 20 MIN: CPT | Mod: 95

## 2020-12-18 NOTE — HISTORY OF PRESENT ILLNESS
[Home] : at home, [unfilled] , at the time of the visit. [Medical Office: ([facility])___] : at the medical office located in  [Verbal consent obtained from patient] : the patient, [unfilled] [de-identified] : 36 y/o female patient presents today:\par - f/u weight management/obesity - patient currently taking 15mg and Topamax 25mg x 2 month, currently denies any side effects. States has started healthy diet, physical activity very minimal patient underwent total left hip replacement on 12/10/2020. Weight loss since last month: 4 lbs, Total weight loss 15 lbs \par

## 2020-12-18 NOTE — REVIEW OF SYSTEMS
[Joint Pain] : joint pain [Joint Stiffness] : joint stiffness [Joint Swelling] : joint swelling [Muscle Pain] : muscle pain [Negative] : Psychiatric [Muscle Weakness] : no muscle weakness [Back Pain] : no back pain

## 2020-12-18 NOTE — PHYSICAL EXAM
[No Acute Distress] : no acute distress [Well Nourished] : well nourished [Well Developed] : well developed [Well-Appearing] : well-appearing [Normal Voice/Communication] : normal voice/communication [Normal Outer Ear/Nose] : the outer ears and nose were normal in appearance [Normal Oropharynx] : the oropharynx was normal [No Respiratory Distress] : no respiratory distress  [No Accessory Muscle Use] : no accessory muscle use [Normal Rate] : normal rate  [Normal] : no rash [Memory Grossly Normal] : memory grossly normal [Normal Affect] : the affect was normal [Alert and Oriented x3] : oriented to person, place, and time [Normal Insight/Judgement] : insight and judgment were intact

## 2020-12-18 NOTE — COUNSELING
[Potential consequences of obesity discussed] : Potential consequences of obesity discussed [Benefits of weight loss discussed] : Benefits of weight loss discussed [Structured Weight Management Program suggested:] : Structured weight management program suggested [Encouraged to increase physical activity] : Encouraged to increase physical activity [Good understanding] : Patient has a good understanding of disease, goals and obesity follow-up plan [FreeTextEntry1] : Weight loss medications

## 2021-01-11 ENCOUNTER — RX RENEWAL (OUTPATIENT)
Age: 36
End: 2021-01-11

## 2021-01-22 ENCOUNTER — APPOINTMENT (OUTPATIENT)
Dept: INTERNAL MEDICINE | Facility: CLINIC | Age: 36
End: 2021-01-22
Payer: COMMERCIAL

## 2021-01-22 VITALS — DIASTOLIC BLOOD PRESSURE: 82 MMHG | SYSTOLIC BLOOD PRESSURE: 121 MMHG

## 2021-01-22 VITALS
OXYGEN SATURATION: 100 % | BODY MASS INDEX: 34.41 KG/M2 | TEMPERATURE: 98 F | HEIGHT: 62 IN | WEIGHT: 187 LBS | HEART RATE: 84 BPM

## 2021-01-22 PROCEDURE — 99072 ADDL SUPL MATRL&STAF TM PHE: CPT

## 2021-01-22 PROCEDURE — G0447 BEHAVIOR COUNSEL OBESITY 15M: CPT | Mod: 59

## 2021-01-22 PROCEDURE — 99214 OFFICE O/P EST MOD 30 MIN: CPT | Mod: 25

## 2021-01-22 NOTE — REVIEW OF SYSTEMS
[Negative] : Integumentary [Muscle Weakness] : no muscle weakness [Insomnia] : insomnia [Suicidal] : not suicidal [Anxiety] : no anxiety [Depression] : no depression

## 2021-01-22 NOTE — PHYSICAL EXAM
[Well Nourished] : well nourished [Well Developed] : well developed [Well-Appearing] : well-appearing [Normal Voice/Communication] : normal voice/communication [Normal Outer Ear/Nose] : the outer ears and nose were normal in appearance [Normal Oropharynx] : the oropharynx was normal [No Respiratory Distress] : no respiratory distress  [No Accessory Muscle Use] : no accessory muscle use [Normal Rate] : normal rate  [Speech Grossly Normal] : speech grossly normal [Memory Grossly Normal] : memory grossly normal [Normal Affect] : the affect was normal [Alert and Oriented x3] : oriented to person, place, and time [Normal Mood] : the mood was normal [Normal Insight/Judgement] : insight and judgment were intact [Normal Nasal Mucosa] : the nasal mucosa was normal [Normal TMs] : both tympanic membranes were normal [No Lymphadenopathy] : no lymphadenopathy [Clear to Auscultation] : lungs were clear to auscultation bilaterally [Regular Rhythm] : with a regular rhythm [Normal S1, S2] : normal S1 and S2 [No Edema] : there was no peripheral edema [Normal] : normal gait, coordination grossly intact, no focal deficits and deep tendon reflexes were 2+ and symmetric

## 2021-01-22 NOTE — COUNSELING
[Potential consequences of obesity discussed] : Potential consequences of obesity discussed [Benefits of weight loss discussed] : Benefits of weight loss discussed [Structured Weight Management Program suggested:] : Structured weight management program suggested [Encouraged to increase physical activity] : Encouraged to increase physical activity [Weigh Self Weekly] : weigh self weekly [Decrease Portions] : decrease portions [Good understanding] : Patient has a good understanding of disease, goals and obesity follow-up plan [FreeTextEntry1] : Weight loss medication [FreeTextEntry4] : 15

## 2021-01-22 NOTE — HISTORY OF PRESENT ILLNESS
[de-identified] : 36 y/o female patient presents today:\par - f/u weight management/obesity - patient currently taking 15mg and Topamax 25mg, currently denies any side effects. States has started healthy diet, physical activity very minimal patient underwent total left hip replacement on 12/10/2020, has been doing physical therapy 2-3x weekly and trying to walk at home 3-4x weekly. States when restarted medication after surgery did not feel same effects as when first started.  Weight loss since last month: 0 lbs, Total weight loss 15 lbs \par - c/o persistent insomnia - states has issues prior to starting phentermine, did not change, using Tylenol PM but not helping

## 2021-03-01 ENCOUNTER — APPOINTMENT (OUTPATIENT)
Dept: INTERNAL MEDICINE | Facility: CLINIC | Age: 36
End: 2021-03-01
Payer: COMMERCIAL

## 2021-03-01 VITALS
HEART RATE: 113 BPM | DIASTOLIC BLOOD PRESSURE: 85 MMHG | TEMPERATURE: 98 F | WEIGHT: 182 LBS | SYSTOLIC BLOOD PRESSURE: 121 MMHG | HEIGHT: 62 IN | OXYGEN SATURATION: 99 % | BODY MASS INDEX: 33.49 KG/M2

## 2021-03-01 PROCEDURE — G0447 BEHAVIOR COUNSEL OBESITY 15M: CPT | Mod: 59

## 2021-03-01 PROCEDURE — 99072 ADDL SUPL MATRL&STAF TM PHE: CPT

## 2021-03-01 PROCEDURE — 99213 OFFICE O/P EST LOW 20 MIN: CPT | Mod: 25

## 2021-03-02 ENCOUNTER — TRANSCRIPTION ENCOUNTER (OUTPATIENT)
Age: 36
End: 2021-03-02

## 2021-03-02 NOTE — PHYSICAL EXAM
[Well Nourished] : well nourished [Well Developed] : well developed [Well-Appearing] : well-appearing [Normal Voice/Communication] : normal voice/communication [Normal Outer Ear/Nose] : the outer ears and nose were normal in appearance [Normal Oropharynx] : the oropharynx was normal [Normal TMs] : both tympanic membranes were normal [Normal Nasal Mucosa] : the nasal mucosa was normal [No Lymphadenopathy] : no lymphadenopathy [Supple] : supple [Thyroid Normal, No Nodules] : the thyroid was normal and there were no nodules present [No Respiratory Distress] : no respiratory distress  [No Accessory Muscle Use] : no accessory muscle use [Clear to Auscultation] : lungs were clear to auscultation bilaterally [Normal Rate] : normal rate  [Regular Rhythm] : with a regular rhythm [Normal S1, S2] : normal S1 and S2 [No Edema] : there was no peripheral edema [Normal] : normal gait, coordination grossly intact, no focal deficits and deep tendon reflexes were 2+ and symmetric [Speech Grossly Normal] : speech grossly normal [Memory Grossly Normal] : memory grossly normal [Normal Affect] : the affect was normal [Alert and Oriented x3] : oriented to person, place, and time [Normal Mood] : the mood was normal [Normal Insight/Judgement] : insight and judgment were intact

## 2021-03-02 NOTE — HISTORY OF PRESENT ILLNESS
[de-identified] : 34 y/o female patient presents today:\par - f/u weight management/obesity - patient currently taking 30mg and Topamax 25mg, currently denies any side effects. States has been better with healthy diet,  has been doing physical therapy 2-3x weekly and trying to walk at home 3-4x weekly. Feels at times she gets discouraged, has been weighing herself daily.  Weight loss since last month: 5 lbs, Total weight loss 20 lbs \par

## 2021-03-02 NOTE — REVIEW OF SYSTEMS
[Muscle Weakness] : no muscle weakness [Suicidal] : not suicidal [Insomnia] : no insomnia [Anxiety] : no anxiety [Depression] : no depression [Negative] : Heme/Lymph

## 2021-03-04 ENCOUNTER — TRANSCRIPTION ENCOUNTER (OUTPATIENT)
Age: 36
End: 2021-03-04

## 2021-03-15 ENCOUNTER — RX RENEWAL (OUTPATIENT)
Age: 36
End: 2021-03-15

## 2021-03-17 ENCOUNTER — APPOINTMENT (OUTPATIENT)
Dept: DERMATOLOGY | Facility: CLINIC | Age: 36
End: 2021-03-17
Payer: COMMERCIAL

## 2021-03-17 PROCEDURE — 99213 OFFICE O/P EST LOW 20 MIN: CPT

## 2021-03-17 PROCEDURE — 99072 ADDL SUPL MATRL&STAF TM PHE: CPT

## 2021-03-20 NOTE — PATIENT PROFILE ADULT - FALL HARM RISK
- Patient came to ED c/o dysphagia, swollen tongue  - Esophagitis vs Pharyngitis vs oral infection    - On PE: swollen tongue, no skin changes, unable to visualize oropharynx, VS stable, afebrile, no leucocytosis    - CT neck non contrast Thickening of the left oropharyngeal and hypopharyngeal soft tissues, probably pharyngitis. Recommend follow-up to ensure resolution and to exclude an underlying mass.  - Started on Cepacol, First wash   - S/p 1 dose Rocephin in ER, will hold off further antibiotics since patient is afebrile and with no leucocytosis  - NPO for now, FS q6hrs  - Aspiration precautions   - Monitor respiratory status   - F/u S and S  - ENT Dr Nicholas consulted - Patient came to ED c/o dysphagia, swollen tongue  - Esophagitis vs Pharyngitis vs oral infection    - On PE: swollen tongue, no skin changes, unable to visualize oropharynx, VS stable, afebrile, no leucocytosis    - CT neck non contrast Thickening of the left oropharyngeal and hypopharyngeal soft tissues, probably pharyngitis. Recommend follow-up to ensure resolution and to exclude an underlying mass.  - Started on Cepacol, First wash   - Started Unasyn  - NPO for now, FS q6hrs  - Aspiration precautions   - Monitor respiratory status   - F/u S and S  - ENT Dr Nicholas consulted  - GI Dr Quigley consulted surgery

## 2021-04-02 ENCOUNTER — RX RENEWAL (OUTPATIENT)
Age: 36
End: 2021-04-02

## 2021-04-06 ENCOUNTER — TRANSCRIPTION ENCOUNTER (OUTPATIENT)
Age: 36
End: 2021-04-06

## 2021-04-07 ENCOUNTER — APPOINTMENT (OUTPATIENT)
Dept: INTERNAL MEDICINE | Facility: CLINIC | Age: 36
End: 2021-04-07
Payer: COMMERCIAL

## 2021-04-07 VITALS
OXYGEN SATURATION: 100 % | TEMPERATURE: 98 F | WEIGHT: 176 LBS | SYSTOLIC BLOOD PRESSURE: 142 MMHG | DIASTOLIC BLOOD PRESSURE: 88 MMHG | HEART RATE: 106 BPM | BODY MASS INDEX: 32.19 KG/M2

## 2021-04-07 PROCEDURE — 99072 ADDL SUPL MATRL&STAF TM PHE: CPT

## 2021-04-07 PROCEDURE — 99213 OFFICE O/P EST LOW 20 MIN: CPT | Mod: 25

## 2021-04-07 PROCEDURE — G0447 BEHAVIOR COUNSEL OBESITY 15M: CPT | Mod: 59

## 2021-04-07 RX ORDER — CELECOXIB 200 MG/1
200 CAPSULE ORAL
Qty: 60 | Refills: 2 | Status: DISCONTINUED | COMMUNITY
Start: 2020-08-14 | End: 2021-04-07

## 2021-04-07 NOTE — COUNSELING
[Potential consequences of obesity discussed] : Potential consequences of obesity discussed [Benefits of weight loss discussed] : Benefits of weight loss discussed [Structured Weight Management Program suggested:] : Structured weight management program suggested [Encouraged to increase physical activity] : Encouraged to increase physical activity [Encouraged to use exercise tracking device] : Encouraged to use exercise tracking device [Weigh Self Weekly] : weigh self weekly [Decrease Portions] : decrease portions [____ min/wk Activity] : [unfilled] min/wk activity [Good understanding] : Patient has a good understanding of disease, goals and obesity follow-up plan [FreeTextEntry1] : Weight loss medications  [FreeTextEntry4] : 15

## 2021-04-07 NOTE — HISTORY OF PRESENT ILLNESS
[de-identified] : 36 y/o female patient presents today:\par - f/u weight management/obesity - patient currently taking 30mg and Topamax 25mg, currently denies any side effects. States has been better with healthy diet, recently completed outpatient PT, has been working out at home, started Beach Body and trying to walk at home 3-4x weekly. Feels now that weight is down medications are more effective.  Weight loss since last month: 6 lbs, Total weight loss 26 lbs \par

## 2021-04-08 ENCOUNTER — TRANSCRIPTION ENCOUNTER (OUTPATIENT)
Age: 36
End: 2021-04-08

## 2021-04-14 ENCOUNTER — RX RENEWAL (OUTPATIENT)
Age: 36
End: 2021-04-14

## 2021-04-29 ENCOUNTER — RX RENEWAL (OUTPATIENT)
Age: 36
End: 2021-04-29

## 2021-05-06 ENCOUNTER — TRANSCRIPTION ENCOUNTER (OUTPATIENT)
Age: 36
End: 2021-05-06

## 2021-05-10 ENCOUNTER — RX RENEWAL (OUTPATIENT)
Age: 36
End: 2021-05-10

## 2021-06-03 ENCOUNTER — RX RENEWAL (OUTPATIENT)
Age: 36
End: 2021-06-03

## 2021-06-09 ENCOUNTER — APPOINTMENT (OUTPATIENT)
Dept: INTERNAL MEDICINE | Facility: CLINIC | Age: 36
End: 2021-06-09
Payer: COMMERCIAL

## 2021-06-09 VITALS — WEIGHT: 164.4 LBS | HEIGHT: 62 IN | BODY MASS INDEX: 30.25 KG/M2

## 2021-06-09 PROCEDURE — 99213 OFFICE O/P EST LOW 20 MIN: CPT | Mod: 25,95

## 2021-06-09 NOTE — PHYSICAL EXAM
[Well Nourished] : well nourished [Well Developed] : well developed [Well-Appearing] : well-appearing [Normal Voice/Communication] : normal voice/communication [Normal Sclera/Conjunctiva] : normal sclera/conjunctiva [PERRL] : pupils equal round and reactive to light [EOMI] : extraocular movements intact [Normal Outer Ear/Nose] : the outer ears and nose were normal in appearance [Normal Oropharynx] : the oropharynx was normal [Thyroid Normal, No Nodules] : the thyroid was normal and there were no nodules present [No Respiratory Distress] : no respiratory distress  [No Accessory Muscle Use] : no accessory muscle use [Normal Rate] : normal rate  [Regular Rhythm] : with a regular rhythm [Normal] : normal gait, coordination grossly intact, no focal deficits and deep tendon reflexes were 2+ and symmetric [Speech Grossly Normal] : speech grossly normal [Memory Grossly Normal] : memory grossly normal [Normal Affect] : the affect was normal [Alert and Oriented x3] : oriented to person, place, and time [Normal Mood] : the mood was normal [Normal Insight/Judgement] : insight and judgment were intact

## 2021-06-09 NOTE — REVIEW OF SYSTEMS
Pt has refractory anasarca and discussed with Dr. Yin and the pt and will initiate mechanical UF.  Pt is agreeable for the same.  Will have  arrange for outpatient dialysis.  Have discussed risk benefits and alternatives to RRT  Will DC amlodipine and increase metoprolol and add Terazosin for better control of HTN  Consent has been obtained.   [Muscle Weakness] : no muscle weakness [Suicidal] : not suicidal [Insomnia] : no insomnia [Anxiety] : no anxiety [Depression] : no depression [Negative] : Heme/Lymph

## 2021-06-09 NOTE — HISTORY OF PRESENT ILLNESS
[Home] : at home, [unfilled] , at the time of the visit. [Medical Office: ([facility])___] : at the medical office located in  [Verbal consent obtained from patient] : the patient, [unfilled] [de-identified] : 36 y/o female patient seen today via telehealth visit:\par - f/u weight management/obesity - patient currently taking 30mg and Topamax 25mg, currently denies any side effects. States has been better with healthy diet, has been working out at home, doing Beach Body 6 days/week and trying to walk at home 3-4x weekly. Feels now that weight is down medications are more effective.  Weight loss since last visit: 12 lbs, Total weight loss 38 lbs \par

## 2021-07-09 ENCOUNTER — APPOINTMENT (OUTPATIENT)
Dept: INTERNAL MEDICINE | Facility: CLINIC | Age: 36
End: 2021-07-09
Payer: COMMERCIAL

## 2021-07-09 VITALS — WEIGHT: 160 LBS | BODY MASS INDEX: 29.44 KG/M2 | HEIGHT: 62 IN

## 2021-07-09 DIAGNOSIS — E66.09 OTHER OBESITY DUE TO EXCESS CALORIES: ICD-10-CM

## 2021-07-09 PROCEDURE — G0447 BEHAVIOR COUNSEL OBESITY 15M: CPT | Mod: 59,95

## 2021-07-09 PROCEDURE — 99213 OFFICE O/P EST LOW 20 MIN: CPT | Mod: 95

## 2021-07-09 NOTE — REVIEW OF SYSTEMS
[Negative] : Heme/Lymph [Muscle Weakness] : no muscle weakness [Suicidal] : not suicidal [Insomnia] : no insomnia [Anxiety] : no anxiety [Depression] : no depression

## 2021-07-09 NOTE — COUNSELING
[Potential consequences of obesity discussed] : Potential consequences of obesity discussed [Benefits of weight loss discussed] : Benefits of weight loss discussed [Structured Weight Management Program suggested:] : Structured weight management program suggested [Encouraged to maintain food diary] : Encouraged to maintain food diary [Encouraged to increase physical activity] : Encouraged to increase physical activity [Encouraged to use exercise tracking device] : Encouraged to use exercise tracking device [Weigh Self Weekly] : weigh self weekly [Decrease Portions] : decrease portions [____ min/wk Activity] : [unfilled] min/wk activity [Good understanding] : Patient has a good understanding of disease, goals and obesity follow-up plan [FreeTextEntry1] : Weight loss medications [FreeTextEntry4] : 15

## 2021-07-09 NOTE — HISTORY OF PRESENT ILLNESS
[Home] : at home, [unfilled] , at the time of the visit. [Medical Office: ([facility])___] : at the medical office located in  [Verbal consent obtained from patient] : the patient, [unfilled] [de-identified] : 34 y/o female patient seen today via telehealth visit:\par - f/u weight management/obesity - patient currently taking 30mg and Topamax 25mg, currently denies any side effects. States has been better with healthy diet, has been working out at home, doing Beach Body 6 days/week and trying to walk at home 3-4x weekly. Feels now that weight is down medications are more effective. Last month states cheated a bit with increased sweets, due to graduation parties and July 4th, Weight loss since last visit: 4 lbs, Total weight loss 42 lbs \par

## 2021-07-15 ENCOUNTER — APPOINTMENT (OUTPATIENT)
Dept: DERMATOLOGY | Facility: CLINIC | Age: 36
End: 2021-07-15

## 2021-07-21 ENCOUNTER — APPOINTMENT (OUTPATIENT)
Dept: DERMATOLOGY | Facility: CLINIC | Age: 36
End: 2021-07-21
Payer: COMMERCIAL

## 2021-07-21 ENCOUNTER — LABORATORY RESULT (OUTPATIENT)
Age: 36
End: 2021-07-21

## 2021-07-21 DIAGNOSIS — D48.5 NEOPLASM OF UNCERTAIN BEHAVIOR OF SKIN: ICD-10-CM

## 2021-07-21 PROCEDURE — 99213 OFFICE O/P EST LOW 20 MIN: CPT | Mod: 25

## 2021-07-21 PROCEDURE — 99072 ADDL SUPL MATRL&STAF TM PHE: CPT

## 2021-07-21 PROCEDURE — 11106 INCAL BX SKN SINGLE LES: CPT

## 2021-07-21 NOTE — ASSESSMENT
[FreeTextEntry1] : incisional bx as above\par r/o pilar cyst\par \par folliculitis\par discussed hibiclens or HC PRN shaving; SED

## 2021-07-21 NOTE — PROCEDURE
[___ cm] : [unfilled] cm [___ ml of 1% lidocaine w/ 1:100,000 epinephrine] : [unfilled] ml of 1% lidocaine with 1:100,000 epinephrine [FreeTextEntry1] : Amari Taylor [TWNoteComboBox1] : Epidermal Inclusion Cyst [TWNoteComboBox4] : Epidermal Inclusion Cyst

## 2021-08-04 ENCOUNTER — APPOINTMENT (OUTPATIENT)
Dept: DERMATOLOGY | Facility: CLINIC | Age: 36
End: 2021-08-04

## 2021-08-23 ENCOUNTER — RX RENEWAL (OUTPATIENT)
Age: 36
End: 2021-08-23

## 2021-09-01 ENCOUNTER — APPOINTMENT (OUTPATIENT)
Dept: INTERNAL MEDICINE | Facility: CLINIC | Age: 36
End: 2021-09-01
Payer: COMMERCIAL

## 2021-09-01 PROCEDURE — G0447 BEHAVIOR COUNSEL OBESITY 15M: CPT | Mod: 59,95

## 2021-09-01 PROCEDURE — 99214 OFFICE O/P EST MOD 30 MIN: CPT | Mod: 25,95

## 2021-09-02 ENCOUNTER — TRANSCRIPTION ENCOUNTER (OUTPATIENT)
Age: 36
End: 2021-09-02

## 2021-09-09 VITALS — BODY MASS INDEX: 29.33 KG/M2 | HEIGHT: 62 IN | WEIGHT: 159.4 LBS

## 2021-09-09 NOTE — COUNSELING
[Potential consequences of obesity discussed] : Potential consequences of obesity discussed [Benefits of weight loss discussed] : Benefits of weight loss discussed [Structured Weight Management Program suggested:] : Structured weight management program suggested [Encouraged to increase physical activity] : Encouraged to increase physical activity [Encouraged to maintain food diary] : Encouraged to maintain food diary [Encouraged to use exercise tracking device] : Encouraged to use exercise tracking device [Weigh Self Weekly] : weigh self weekly [Decrease Portions] : decrease portions [____ min/wk Activity] : [unfilled] min/wk activity [Good understanding] : Patient has a good understanding of disease, goals and obesity follow-up plan [FreeTextEntry1] : Weight loss medications [FreeTextEntry4] : 15

## 2021-09-09 NOTE — HISTORY OF PRESENT ILLNESS
[Home] : at home, [unfilled] , at the time of the visit. [Medical Office: ([facility])___] : at the medical office located in  [Verbal consent obtained from patient] : the patient, [unfilled] [de-identified] : 34 y/o female patient seen today via telehealth visit:\par - f/u weight management/obesity - patient currently taking 30mg and Topamax 25mg, currently denies any side effects. States has been better with healthy diet, has been working out at home, doing Beach Body 6 days/week and trying to walk at home 3-4x weekly.  States last month was difficult with exercising due to overtime shifts, was very tired, has still been keeping up with diet. Current weight: 159.4,  Weight loss since last visit: 1 lb, Total weight loss 43 lbs \par - needs Topamax sent for 90 day supply \par

## 2021-09-20 ENCOUNTER — TRANSCRIPTION ENCOUNTER (OUTPATIENT)
Age: 36
End: 2021-09-20

## 2021-09-21 ENCOUNTER — NON-APPOINTMENT (OUTPATIENT)
Age: 36
End: 2021-09-21

## 2021-09-21 ENCOUNTER — APPOINTMENT (OUTPATIENT)
Dept: OBGYN | Facility: CLINIC | Age: 36
End: 2021-09-21
Payer: COMMERCIAL

## 2021-09-21 VITALS
HEIGHT: 62 IN | WEIGHT: 160 LBS | DIASTOLIC BLOOD PRESSURE: 88 MMHG | SYSTOLIC BLOOD PRESSURE: 122 MMHG | BODY MASS INDEX: 29.44 KG/M2

## 2021-09-21 PROCEDURE — 36415 COLL VENOUS BLD VENIPUNCTURE: CPT

## 2021-09-21 PROCEDURE — 99385 PREV VISIT NEW AGE 18-39: CPT

## 2021-09-21 NOTE — HISTORY OF PRESENT ILLNESS
[Currently Active] : currently active [Men] : men [Vaginal] : vaginal [No] : No [Yes] : Yes [Patient would like to be screened for STIs] : Patient would like to be screened for STIs [FreeTextEntry1] : 37yo  LMP 21 here for establishment of care.  No complaints.  h/o PCOS\par \par  3/22/09, male, 35, [id number], [facility], [place]

## 2021-09-21 NOTE — COUNSELING
[Nutrition/ Exercise/ Weight Management] : nutrition, exercise, weight management [Vitamins/Supplements] : vitamins/supplements [Breast Self Exam] : breast self exam [Bladder Hygiene] : bladder hygiene [Contraception/ Emergency Contraception/ Safe Sexual Practices] : contraception, emergency contraception, safe sexual practices [FreeTextEntry2] : Vulvar Hygiene

## 2021-09-21 NOTE — DISCUSSION/SUMMARY
[FreeTextEntry1] : - Pap/HPV obtained today\par - STI testing done\par - + vaginal discharge, sometimes with yellow, sometimes pruritic, sometimes, not\par All forms of reversible contraception including combined hormonal contraception, progestin-only contraceptives, IUDs, and implants were reviewed with the patient.  The risks, benefits, and alternatives were discussed. \par  \par Pt would like Paragard IUD and will return for placement.

## 2021-09-22 LAB
C TRACH RRNA SPEC QL NAA+PROBE: NOT DETECTED
HBV SURFACE AG SER QL: NONREACTIVE
HCV AB SER QL: NONREACTIVE
HCV S/CO RATIO: 0.4 S/CO
HIV1+2 AB SPEC QL IA.RAPID: NONREACTIVE
HPV HIGH+LOW RISK DNA PNL CVX: NOT DETECTED
N GONORRHOEA RRNA SPEC QL NAA+PROBE: NOT DETECTED
SOURCE AMPLIFICATION: NORMAL
SOURCE TP AMPLIFICATION: NORMAL
T PALLIDUM AB SER QL IA: NEGATIVE
T VAGINALIS RRNA SPEC QL NAA+PROBE: NOT DETECTED

## 2021-09-24 ENCOUNTER — TRANSCRIPTION ENCOUNTER (OUTPATIENT)
Age: 36
End: 2021-09-24

## 2021-09-29 ENCOUNTER — NON-APPOINTMENT (OUTPATIENT)
Age: 36
End: 2021-09-29

## 2021-09-29 ENCOUNTER — TRANSCRIPTION ENCOUNTER (OUTPATIENT)
Age: 36
End: 2021-09-29

## 2021-09-29 LAB — CYTOLOGY CVX/VAG DOC THIN PREP: ABNORMAL

## 2021-10-18 ENCOUNTER — APPOINTMENT (OUTPATIENT)
Dept: OBGYN | Facility: CLINIC | Age: 36
End: 2021-10-18
Payer: COMMERCIAL

## 2021-10-18 VITALS
BODY MASS INDEX: 29.08 KG/M2 | WEIGHT: 158 LBS | SYSTOLIC BLOOD PRESSURE: 122 MMHG | HEIGHT: 62 IN | DIASTOLIC BLOOD PRESSURE: 78 MMHG

## 2021-10-18 PROCEDURE — 58300 INSERT INTRAUTERINE DEVICE: CPT

## 2021-10-18 PROCEDURE — 76830 TRANSVAGINAL US NON-OB: CPT

## 2021-10-18 NOTE — PROCEDURE
[IUD Placement] : intrauterine device (IUD) placement [Time out performed] : Pre-procedure time out performed.  Patient's name, date of birth and procedure confirmed. [Consent Obtained] : Consent obtained [Risks] : risks [Benefits] : benefits [Alternatives] : alternatives [Patient] : patient [Infection] : infection [Bleeding] : bleeding [Pain] : pain [Expulsion] : expulsion [Failure] : failure [Uterine Perforation] : uterine perforation [Neg Pregnancy Test] : negative pregnancy test [Betadine] : Betadine [Ring Forceps] : Ring forceps [ParaGard IUD] : ParaGard IUD [Tolerated Well] : Patient tolerated the procedure well [No Complications] : No complications [Prevention of Pregnancy] : prevention of pregnancy [Easy Passage] : Easy passage

## 2021-10-28 ENCOUNTER — APPOINTMENT (OUTPATIENT)
Dept: INTERNAL MEDICINE | Facility: CLINIC | Age: 36
End: 2021-10-28
Payer: COMMERCIAL

## 2021-10-28 VITALS
BODY MASS INDEX: 28.34 KG/M2 | HEART RATE: 90 BPM | TEMPERATURE: 98 F | OXYGEN SATURATION: 100 % | HEIGHT: 62 IN | DIASTOLIC BLOOD PRESSURE: 85 MMHG | SYSTOLIC BLOOD PRESSURE: 130 MMHG | WEIGHT: 154 LBS

## 2021-10-28 DIAGNOSIS — L24.9 IRRITANT CONTACT DERMATITIS, UNSPECIFIED CAUSE: ICD-10-CM

## 2021-10-28 DIAGNOSIS — M25.852 OTHER SPECIFIED JOINT DISORDERS, LEFT HIP: ICD-10-CM

## 2021-10-28 DIAGNOSIS — Z87.2 PERSONAL HISTORY OF DISEASES OF THE SKIN AND SUBCUTANEOUS TISSUE: ICD-10-CM

## 2021-10-28 PROCEDURE — 99395 PREV VISIT EST AGE 18-39: CPT | Mod: 25

## 2021-10-28 PROCEDURE — 36415 COLL VENOUS BLD VENIPUNCTURE: CPT

## 2021-10-28 PROCEDURE — G0447 BEHAVIOR COUNSEL OBESITY 15M: CPT | Mod: NC,59

## 2021-10-28 PROCEDURE — 99213 OFFICE O/P EST LOW 20 MIN: CPT | Mod: 25

## 2021-10-28 NOTE — HEALTH RISK ASSESSMENT
[] : No [No] : In the past 12 months have you used drugs other than those required for medical reasons? No [0] : 2) Feeling down, depressed, or hopeless: Not at all (0) [PHQ-2 Negative - No further assessment needed] : PHQ-2 Negative - No further assessment needed [[id number]] : 0 [Patient reported PAP Smear was normal] : Patient reported PAP Smear was normal [Change in mental status noted] : No change in mental status noted [None] : None [With Family] : lives with family [Employed] : employed [] :  [# Of Children ___] : has [unfilled] children [Sexually Active] : sexually active [High Risk Behavior] : no high risk behavior [Feels Safe at Home] : Feels safe at home [Reports changes in hearing] : Reports no changes in hearing [Reports changes in vision] : Reports no changes in vision [Reports normal functional visual acuity (ie: able to read med bottle)] : Reports normal functional visual acuity [Reports changes in dental health] : Reports no changes in dental health [PapSmearDate] : 09/2021

## 2021-10-28 NOTE — HISTORY OF PRESENT ILLNESS
[de-identified] : 37 y/o female patient presents today for annual CPE/fasting labs \par - HCM: last PAP - 09/2021 WNL, recently had paragard IUD placed\par - f/u weight management/obesity - patient currently taking 30mg and Topamax 50mg, currently denies any side effects. States has been better with healthy diet, has been working out at home, doing Beach Body 6 days/week and trying to walk at home 3-4x weekly. States last month was difficult with exercising due to overtime shifts, was very tired, has still been keeping up with diet. Weight loss since last visit: 6 lb, Total weight loss 48 lbs \par - f/u chronic insomnia - patient works overnights, states has tried Tylenol PM which does not help, was prescribed Vistaril but has not taken consistently, feels very tired during the day \par

## 2021-11-14 ENCOUNTER — TRANSCRIPTION ENCOUNTER (OUTPATIENT)
Age: 36
End: 2021-11-14

## 2021-11-14 DIAGNOSIS — R79.89 OTHER SPECIFIED ABNORMAL FINDINGS OF BLOOD CHEMISTRY: ICD-10-CM

## 2021-11-14 LAB
25(OH)D3 SERPL-MCNC: 39.9 NG/ML
ALBUMIN SERPL ELPH-MCNC: 4.6 G/DL
ALP BLD-CCNC: 63 U/L
ALT SERPL-CCNC: 23 U/L
ANA PAT FLD IF-IMP: ABNORMAL
ANA SER IF-ACNC: ABNORMAL
ANION GAP SERPL CALC-SCNC: 13 MMOL/L
APPEARANCE: ABNORMAL
AST SERPL-CCNC: 18 U/L
BACTERIA: NEGATIVE
BASOPHILS # BLD AUTO: 0.04 K/UL
BASOPHILS NFR BLD AUTO: 0.4 %
BILIRUB SERPL-MCNC: 0.5 MG/DL
BILIRUBIN URINE: NEGATIVE
BLOOD URINE: NEGATIVE
BUN SERPL-MCNC: 14 MG/DL
CALCIUM SERPL-MCNC: 9.5 MG/DL
CHLORIDE SERPL-SCNC: 109 MMOL/L
CHOLEST SERPL-MCNC: 162 MG/DL
CO2 SERPL-SCNC: 20 MMOL/L
COLOR: YELLOW
CREAT SERPL-MCNC: 0.73 MG/DL
DHEA-SULFATE, SERUM: 96 UG/DL
DSDNA AB SER-ACNC: <12 IU/ML
ENA SS-A AB SER IA-ACNC: <0.2 AL
ENA SS-B AB SER IA-ACNC: <0.2 AL
EOSINOPHIL # BLD AUTO: 0.06 K/UL
EOSINOPHIL NFR BLD AUTO: 0.6 %
ESTIMATED AVERAGE GLUCOSE: 85 MG/DL
FOLATE SERPL-MCNC: 13.8 NG/ML
GLUCOSE QUALITATIVE U: NEGATIVE
GLUCOSE SERPL-MCNC: 97 MG/DL
HBA1C MFR BLD HPLC: 4.6 %
HCT VFR BLD CALC: 41.9 %
HDLC SERPL-MCNC: 57 MG/DL
HGB BLD-MCNC: 14.2 G/DL
HYALINE CASTS: 1 /LPF
IMM GRANULOCYTES NFR BLD AUTO: 0.3 %
IRON SATN MFR SERPL: 24 %
IRON SERPL-MCNC: 66 UG/DL
KETONES URINE: NEGATIVE
LDLC SERPL CALC-MCNC: 88 MG/DL
LEUKOCYTE ESTERASE URINE: ABNORMAL
LYMPHOCYTES # BLD AUTO: 2.85 K/UL
LYMPHOCYTES NFR BLD AUTO: 29.8 %
MAGNESIUM SERPL-MCNC: 2.2 MG/DL
MAN DIFF?: NORMAL
MCHC RBC-ENTMCNC: 32 PG
MCHC RBC-ENTMCNC: 33.9 GM/DL
MCV RBC AUTO: 94.4 FL
MICROSCOPIC-UA: NORMAL
MONOCYTES # BLD AUTO: 0.45 K/UL
MONOCYTES NFR BLD AUTO: 4.7 %
NEUTROPHILS # BLD AUTO: 6.13 K/UL
NEUTROPHILS NFR BLD AUTO: 64.2 %
NITRITE URINE: NEGATIVE
NONHDLC SERPL-MCNC: 105 MG/DL
PH URINE: 5.5
PHOSPHATE SERPL-MCNC: 3.3 MG/DL
PLATELET # BLD AUTO: 301 K/UL
POTASSIUM SERPL-SCNC: 4.3 MMOL/L
PROT SERPL-MCNC: 7.1 G/DL
PROTEIN URINE: NORMAL
RBC # BLD: 4.44 M/UL
RBC # FLD: 12.7 %
RED BLOOD CELLS URINE: 10 /HPF
RHEUMATOID FACT SER QL: <10 IU/ML
SODIUM SERPL-SCNC: 142 MMOL/L
SPECIFIC GRAVITY URINE: 1.03
SQUAMOUS EPITHELIAL CELLS: 3 /HPF
T3FREE SERPL-MCNC: 3.33 PG/ML
T4 FREE SERPL-MCNC: 1.3 NG/DL
THYROGLOB AB SERPL-ACNC: <20 IU/ML
THYROPEROXIDASE AB SERPL IA-ACNC: <10 IU/ML
TIBC SERPL-MCNC: 279 UG/DL
TRIGL SERPL-MCNC: 85 MG/DL
TSH SERPL-ACNC: 1.9 UIU/ML
UIBC SERPL-MCNC: 213 UG/DL
UROBILINOGEN URINE: NORMAL
VIT B12 SERPL-MCNC: 889 PG/ML
WBC # FLD AUTO: 9.56 K/UL
WHITE BLOOD CELLS URINE: 6 /HPF

## 2021-11-24 ENCOUNTER — APPOINTMENT (OUTPATIENT)
Dept: INTERNAL MEDICINE | Facility: CLINIC | Age: 36
End: 2021-11-24
Payer: COMMERCIAL

## 2021-11-24 ENCOUNTER — RX RENEWAL (OUTPATIENT)
Age: 36
End: 2021-11-24

## 2021-11-24 VITALS — HEIGHT: 62 IN | BODY MASS INDEX: 27.94 KG/M2 | WEIGHT: 151.8 LBS

## 2021-11-24 PROCEDURE — G0447 BEHAVIOR COUNSEL OBESITY 15M: CPT | Mod: 59,95

## 2021-11-24 PROCEDURE — 99213 OFFICE O/P EST LOW 20 MIN: CPT | Mod: 25,95

## 2021-11-24 NOTE — HISTORY OF PRESENT ILLNESS
[Home] : at home, [unfilled] , at the time of the visit. [Medical Office: ([facility])___] : at the medical office located in  [Verbal consent obtained from patient] : the patient, [unfilled] [de-identified] : 37 y/o female patient seen today via telehealth visit:\par - f/u weight management/obesity - patient currently taking 15mg and Topamax 50mg, currently denies any side effects. States has been better with healthy diet, has been working out at home, doing Beach Body 6 days/week and trying to walk at home 3-4x weekly. States last month was difficult with exercising due to overtime shifts, was very tired, has still been keeping up with diet. Weight loss since last visit: 3 lb, Total weight loss 51 lbs \par - f/u chronic insomnia - feels that with decrease of phentermine from 30-->15mg able fall back asleep quickly if she wakes up at night which is improvement from previous

## 2021-11-24 NOTE — PHYSICAL EXAM
[No Acute Distress] : no acute distress [Well Nourished] : well nourished [Well Developed] : well developed [Normal Voice/Communication] : normal voice/communication [No Respiratory Distress] : no respiratory distress  [No Accessory Muscle Use] : no accessory muscle use [Normal Rate] : normal rate  [Normal] : affect was normal and insight and judgment were intact

## 2022-01-03 ENCOUNTER — APPOINTMENT (OUTPATIENT)
Dept: OBGYN | Facility: CLINIC | Age: 37
End: 2022-01-03
Payer: COMMERCIAL

## 2022-01-03 VITALS
WEIGHT: 164 LBS | DIASTOLIC BLOOD PRESSURE: 64 MMHG | SYSTOLIC BLOOD PRESSURE: 116 MMHG | HEIGHT: 62 IN | BODY MASS INDEX: 30.18 KG/M2

## 2022-01-03 PROCEDURE — 99213 OFFICE O/P EST LOW 20 MIN: CPT

## 2022-01-03 NOTE — HISTORY OF PRESENT ILLNESS
[FreeTextEntry1] : 35yo here w/vaginal odor and discharge. [No] : Patient does not have concerns regarding sex [Currently Active] : currently active

## 2022-01-03 NOTE — PHYSICAL EXAM
[Appropriately responsive] : appropriately responsive [Alert] : alert [No Acute Distress] : no acute distress [Soft] : soft [Non-tender] : non-tender [Non-distended] : non-distended [No HSM] : No HSM [No Lesions] : no lesions [No Mass] : no mass [Oriented x3] : oriented x3 [Labia Majora] : normal [Labia Minora] : normal [Discharge] : a  ~M vaginal discharge was present [Scant] : scant [Foul Smelling] : foul smelling [Normal] : normal [Uterine Adnexae] : normal

## 2022-01-04 ENCOUNTER — NON-APPOINTMENT (OUTPATIENT)
Age: 37
End: 2022-01-04

## 2022-01-04 LAB
C TRACH RRNA SPEC QL NAA+PROBE: NOT DETECTED
CANDIDA VAG CYTO: NOT DETECTED
G VAGINALIS+PREV SP MTYP VAG QL MICRO: DETECTED
N GONORRHOEA RRNA SPEC QL NAA+PROBE: NOT DETECTED
SOURCE AMPLIFICATION: NORMAL
SOURCE AMPLIFICATION: NORMAL
T VAGINALIS RRNA SPEC QL NAA+PROBE: NOT DETECTED
T VAGINALIS VAG QL WET PREP: NOT DETECTED

## 2022-01-19 ENCOUNTER — APPOINTMENT (OUTPATIENT)
Dept: INTERNAL MEDICINE | Facility: CLINIC | Age: 37
End: 2022-01-19
Payer: COMMERCIAL

## 2022-01-19 VITALS — HEIGHT: 62 IN | WEIGHT: 158 LBS | BODY MASS INDEX: 29.08 KG/M2

## 2022-01-19 DIAGNOSIS — N89.8 OTHER SPECIFIED NONINFLAMMATORY DISORDERS OF VAGINA: ICD-10-CM

## 2022-01-19 PROCEDURE — 99214 OFFICE O/P EST MOD 30 MIN: CPT | Mod: 95

## 2022-01-19 RX ORDER — METRONIDAZOLE 500 MG/1
500 TABLET ORAL TWICE DAILY
Qty: 14 | Refills: 0 | Status: DISCONTINUED | COMMUNITY
Start: 2022-01-04 | End: 2022-01-19

## 2022-01-19 RX ORDER — ERGOCALCIFEROL 1.25 MG/1
1.25 MG CAPSULE, LIQUID FILLED ORAL
Qty: 15 | Refills: 0 | Status: DISCONTINUED | COMMUNITY
Start: 2021-04-14 | End: 2022-01-19

## 2022-01-19 NOTE — HISTORY OF PRESENT ILLNESS
[Home] : at home, [unfilled] , at the time of the visit. [Medical Office: ([facility])___] : at the medical office located in  [Verbal consent obtained from patient] : the patient, [unfilled] [de-identified] : 37 y/o female patient seen today via telehealth visit:\par - f/u weight management/obesity - patient currently taking 15mg and Topamax 50mg, currently denies any side effects. States has been better with healthy diet, has been working out at home, States last month was difficult with holidays, and Covid in family members, admits has been skipping dosage of Phentermine due to late shifts to see if able to sleep better \par  Weight loss since last visit: +7 lbs, Total weight loss 44 lbs \par - f/u chronic insomnia - discussed with patient to keep log if days skips Phentermine helps sleep better or same as being on the medication, if issue continues will need to D/C phentermine

## 2022-02-10 ENCOUNTER — RX RENEWAL (OUTPATIENT)
Age: 37
End: 2022-02-10

## 2022-02-28 ENCOUNTER — NON-APPOINTMENT (OUTPATIENT)
Age: 37
End: 2022-02-28

## 2022-03-08 NOTE — DISCHARGE NOTE PROVIDER - NSDCHHCONTACT_GEN_ALL_CORE_FT
[Well Developed] : well developed [No Acute Distress] : no acute distress [Ill-Appearing] : ill-appearing [Normal Conjunctiva] : normal conjunctiva [No Carotid Bruit] : no carotid bruit [Normal S1, S2] : normal S1, S2 [No Murmur] : no murmur [No Rub] : no rub [No Gallop] : no gallop [Clear Lung Fields] : clear lung fields [Good Air Entry] : good air entry [No Respiratory Distress] : no respiratory distress  [Soft] : abdomen soft [Non Tender] : non-tender [No Masses/organomegaly] : no masses/organomegaly [Normal Bowel Sounds] : normal bowel sounds [No Edema] : no edema [No Cyanosis] : no cyanosis [No Clubbing] : no clubbing [No Varicosities] : no varicosities [No Rash] : no rash [No Skin Lesions] : no skin lesions [Moves all extremities] : moves all extremities As certified below, I, or a nurse practitioner or physician assistant working with me, had a face-to-face encounter that meets the physician face-to-face encounter requirements. [No Focal Deficits] : no focal deficits [Normal Speech] : normal speech [Alert and Oriented] : alert and oriented [Normal memory] : normal memory [de-identified] : no JVD [de-identified] : walks with a walker

## 2022-03-09 ENCOUNTER — APPOINTMENT (OUTPATIENT)
Dept: INTERNAL MEDICINE | Facility: CLINIC | Age: 37
End: 2022-03-09
Payer: COMMERCIAL

## 2022-03-09 VITALS — HEIGHT: 62 IN | BODY MASS INDEX: 30.55 KG/M2 | WEIGHT: 166 LBS

## 2022-03-09 PROCEDURE — 99214 OFFICE O/P EST MOD 30 MIN: CPT | Mod: 95

## 2022-03-09 RX ORDER — HYDROXYZINE HYDROCHLORIDE 25 MG/1
25 TABLET ORAL
Qty: 90 | Refills: 0 | Status: DISCONTINUED | COMMUNITY
Start: 2021-01-22 | End: 2022-03-09

## 2022-03-14 NOTE — HISTORY OF PRESENT ILLNESS
[Home] : at home, [unfilled] , at the time of the visit. [Medical Office: ([facility])___] : at the medical office located in  [Verbal consent obtained from patient] : the patient, [unfilled] [de-identified] : 35 y/o female patient seen today via telehealth visit:\par - f/u weight management/obesity - patient currently taking 15mg and Topamax 50mg, currently denies any side effects. States has been better with healthy diet, has been working out at home,wants to discuss change in medication dosage was skipping dosage of Phentermine due to insomnia but has been gaining weight \par  Weight loss since last visit: +8 lbs, Total weight loss 36 lbs

## 2022-03-15 ENCOUNTER — APPOINTMENT (OUTPATIENT)
Dept: OBGYN | Facility: CLINIC | Age: 37
End: 2022-03-15
Payer: COMMERCIAL

## 2022-03-15 VITALS
SYSTOLIC BLOOD PRESSURE: 116 MMHG | BODY MASS INDEX: 30.36 KG/M2 | HEIGHT: 62 IN | DIASTOLIC BLOOD PRESSURE: 68 MMHG | WEIGHT: 165 LBS

## 2022-03-15 PROCEDURE — 36415 COLL VENOUS BLD VENIPUNCTURE: CPT

## 2022-03-15 PROCEDURE — 99213 OFFICE O/P EST LOW 20 MIN: CPT

## 2022-03-15 NOTE — PHYSICAL EXAM
[Alert] : alert [Appropriately responsive] : appropriately responsive [No Acute Distress] : no acute distress [Soft] : soft [Non-tender] : non-tender [Non-distended] : non-distended [No HSM] : No HSM [No Lesions] : no lesions [No Mass] : no mass [Oriented x3] : oriented x3

## 2022-03-15 NOTE — HISTORY OF PRESENT ILLNESS
[FreeTextEntry1] : 36yo LMP 3/10/22 here for STI screen.  Asymptomatic; pt w/new partner and would like testing.\par \par Did have an episode of painless bleeding after intercourse 1 month ago.

## 2022-03-16 LAB — HIV1+2 AB SPEC QL IA.RAPID: NONREACTIVE

## 2022-03-18 LAB
C TRACH RRNA SPEC QL NAA+PROBE: NOT DETECTED
HBV SURFACE AG SER QL: NONREACTIVE
HCV AB SER QL: NONREACTIVE
HCV S/CO RATIO: 0.34 S/CO
N GONORRHOEA RRNA SPEC QL NAA+PROBE: NOT DETECTED
SOURCE AMPLIFICATION: NORMAL
SOURCE AMPLIFICATION: NORMAL
T PALLIDUM AB SER QL IA: NEGATIVE
T VAGINALIS RRNA SPEC QL NAA+PROBE: NOT DETECTED

## 2022-04-20 ENCOUNTER — APPOINTMENT (OUTPATIENT)
Dept: INTERNAL MEDICINE | Facility: CLINIC | Age: 37
End: 2022-04-20
Payer: COMMERCIAL

## 2022-04-20 VITALS
HEART RATE: 107 BPM | HEIGHT: 62 IN | OXYGEN SATURATION: 100 % | BODY MASS INDEX: 30.24 KG/M2 | TEMPERATURE: 98 F | WEIGHT: 164.3 LBS | RESPIRATION RATE: 14 BRPM | SYSTOLIC BLOOD PRESSURE: 136 MMHG | DIASTOLIC BLOOD PRESSURE: 91 MMHG

## 2022-04-20 DIAGNOSIS — E66.3 OVERWEIGHT: ICD-10-CM

## 2022-04-20 DIAGNOSIS — Z11.3 ENCOUNTER FOR SCREENING FOR INFECTIONS WITH A PREDOMINANTLY SEXUAL MODE OF TRANSMISSION: ICD-10-CM

## 2022-04-20 PROCEDURE — G0447 BEHAVIOR COUNSEL OBESITY 15M: CPT

## 2022-04-20 PROCEDURE — 99214 OFFICE O/P EST MOD 30 MIN: CPT | Mod: 25

## 2022-04-26 PROBLEM — E66.3 OVERWEIGHT: Noted: 2021-07-09

## 2022-04-26 NOTE — COUNSELING
[Potential consequences of obesity discussed] : Potential consequences of obesity discussed [Benefits of weight loss discussed] : Benefits of weight loss discussed [Encouraged to maintain food diary] : Encouraged to maintain food diary [Structured Weight Management Program suggested:] : Structured weight management program suggested [Encouraged to increase physical activity] : Encouraged to increase physical activity [Encouraged to use exercise tracking device] : Encouraged to use exercise tracking device [Weigh Self Weekly] : weigh self weekly [Decrease Portions] : decrease portions [____ min/wk Activity] : [unfilled] min/wk activity [Good understanding] : Patient has a good understanding of disease, goals and obesity follow-up plan [FreeTextEntry1] : Weight loss medication [FreeTextEntry4] : 15

## 2022-04-26 NOTE — PHYSICAL EXAM
[No Acute Distress] : no acute distress [Well Nourished] : well nourished [Well Developed] : well developed [No Respiratory Distress] : no respiratory distress  [Normal Voice/Communication] : normal voice/communication [No Accessory Muscle Use] : no accessory muscle use [Normal Rate] : normal rate  [Normal] : affect was normal and insight and judgment were intact

## 2022-04-26 NOTE — HISTORY OF PRESENT ILLNESS
[de-identified] : 37 y/o female patient seen today via telehealth visit:\par - f/u weight management/obesity - patient currently taking 30mg and Topamax 50mg, currently denies any side effects. States has been better with healthy diet, has been working out at home \par  Weight loss since last visit: 1 lbs, Total weight loss 37 lbs \par - f/u generalized anxiety/insomnia - feels anxiety throughout the day causes increase in insomnia unable to stop thinking about things while trying to sleep

## 2022-04-28 ENCOUNTER — RX RENEWAL (OUTPATIENT)
Age: 37
End: 2022-04-28

## 2022-05-16 ENCOUNTER — APPOINTMENT (OUTPATIENT)
Dept: INTERNAL MEDICINE | Facility: CLINIC | Age: 37
End: 2022-05-16
Payer: COMMERCIAL

## 2022-05-16 VITALS
HEART RATE: 95 BPM | HEIGHT: 62 IN | SYSTOLIC BLOOD PRESSURE: 133 MMHG | BODY MASS INDEX: 30.07 KG/M2 | DIASTOLIC BLOOD PRESSURE: 88 MMHG | RESPIRATION RATE: 14 BRPM | TEMPERATURE: 98.2 F | WEIGHT: 163.4 LBS | OXYGEN SATURATION: 99 %

## 2022-05-16 DIAGNOSIS — E66.09 OTHER OBESITY DUE TO EXCESS CALORIES: ICD-10-CM

## 2022-05-16 DIAGNOSIS — Z92.29 PERSONAL HISTORY OF OTHER DRUG THERAPY: ICD-10-CM

## 2022-05-16 PROCEDURE — G0447 BEHAVIOR COUNSEL OBESITY 15M: CPT

## 2022-05-16 PROCEDURE — 36415 COLL VENOUS BLD VENIPUNCTURE: CPT

## 2022-05-16 PROCEDURE — 99214 OFFICE O/P EST MOD 30 MIN: CPT | Mod: 25

## 2022-05-16 RX ORDER — BUPROPION HYDROCHLORIDE 100 MG/1
100 TABLET, FILM COATED, EXTENDED RELEASE ORAL DAILY
Qty: 30 | Refills: 0 | Status: DISCONTINUED | COMMUNITY
Start: 2022-04-20 | End: 2022-05-16

## 2022-05-17 ENCOUNTER — TRANSCRIPTION ENCOUNTER (OUTPATIENT)
Age: 37
End: 2022-05-17

## 2022-05-17 RX ORDER — PHENTERMINE HYDROCHLORIDE 30 MG/1
30 CAPSULE ORAL
Qty: 30 | Refills: 0 | Status: DISCONTINUED | COMMUNITY
Start: 2022-04-20

## 2022-05-17 RX ORDER — SEMAGLUTIDE 0.25 MG/.5ML
0.25 INJECTION, SOLUTION SUBCUTANEOUS
Qty: 1 | Refills: 1 | Status: DISCONTINUED | COMMUNITY
Start: 2022-05-16 | End: 2022-05-17

## 2022-05-17 NOTE — HISTORY OF PRESENT ILLNESS
[de-identified] : 37 y/o female patient seen today via telehealth visit:\par - f/u weight management/obesity - patient currently taking 30mg and Topamax 100mg, currently denies any side effects. States has been better with healthy diet, has been working out at home \par  Weight loss since last visit: 1 lbs, Total weight loss 38 lbs \par - f/u generalized anxiety/insomnia - feels anxiety has improved with Wellbutrin but not fully controlled, and no change in sleep \par - requesting STD testing, states recently with new partner, asymptomatic

## 2022-06-07 ENCOUNTER — TRANSCRIPTION ENCOUNTER (OUTPATIENT)
Age: 37
End: 2022-06-07

## 2022-06-07 LAB
C TRACH RRNA SPEC QL NAA+PROBE: NOT DETECTED
COVID-19 NUCLEOCAPSID  GAM ANTIBODY INTERPRETATION: NEGATIVE
COVID-19 SPIKE DOMAIN ANTIBODY INTERPRETATION: POSITIVE
HCV AB SER QL: NONREACTIVE
HCV S/CO RATIO: 0.28 S/CO
HIV1+2 AB SPEC QL IA.RAPID: NONREACTIVE
N GONORRHOEA RRNA SPEC QL NAA+PROBE: NOT DETECTED
SARS-COV-2 AB SERPL IA-ACNC: >250 U/ML
SARS-COV-2 AB SERPL QL IA: 0.08 INDEX
SOURCE AMPLIFICATION: NORMAL
SOURCE AMPLIFICATION: NORMAL
T PALLIDUM AB SER QL IA: NEGATIVE
T VAGINALIS RRNA SPEC QL NAA+PROBE: NOT DETECTED

## 2022-06-15 ENCOUNTER — TRANSCRIPTION ENCOUNTER (OUTPATIENT)
Age: 37
End: 2022-06-15

## 2022-06-20 ENCOUNTER — APPOINTMENT (OUTPATIENT)
Dept: INTERNAL MEDICINE | Facility: CLINIC | Age: 37
End: 2022-06-20

## 2022-06-20 VITALS — WEIGHT: 159.2 LBS | BODY MASS INDEX: 29.3 KG/M2 | HEIGHT: 62 IN

## 2022-06-20 PROCEDURE — 99214 OFFICE O/P EST MOD 30 MIN: CPT | Mod: 95

## 2022-07-29 ENCOUNTER — APPOINTMENT (OUTPATIENT)
Dept: INTERNAL MEDICINE | Facility: CLINIC | Age: 37
End: 2022-07-29

## 2022-07-29 VITALS — HEIGHT: 62 IN | BODY MASS INDEX: 28.71 KG/M2 | WEIGHT: 156 LBS

## 2022-07-29 DIAGNOSIS — Z11.3 ENCOUNTER FOR SCREENING FOR INFECTIONS WITH A PREDOMINANTLY SEXUAL MODE OF TRANSMISSION: ICD-10-CM

## 2022-07-29 PROCEDURE — 99214 OFFICE O/P EST MOD 30 MIN: CPT | Mod: 95

## 2022-07-29 RX ORDER — TOPIRAMATE 50 MG/1
50 TABLET, FILM COATED ORAL DAILY
Qty: 90 | Refills: 0 | Status: DISCONTINUED | COMMUNITY
Start: 2020-10-21 | End: 2022-07-29

## 2022-07-29 NOTE — REVIEW OF SYSTEMS
[Constipation] : constipation [Insomnia] : insomnia [Anxiety] : anxiety [Depression] : no depression [Negative] : Neurological

## 2022-07-29 NOTE — HISTORY OF PRESENT ILLNESS
[Home] : at home, [unfilled] , at the time of the visit. [Medical Office: ([facility])___] : at the medical office located in  [Verbal consent obtained from patient] : the patient, [unfilled] [de-identified] : 35 y/o female patient seen today via telehealth visit:\par - f/u weight management/obesity - patient currently taking Phentermine 37.5mg and Topamax 100mg, and currently on Ozempic 0.25mg weekly, states initial 1-2 weeks was having severe constipation and menstrual irregularities, decreased topamax to 50mg from symptoms continued \par  Weight loss since last visit: 3 lbs, Total weight loss 45 lbs \par \par

## 2022-07-29 NOTE — COUNSELING
[Potential consequences of obesity discussed] : Potential consequences of obesity discussed [Benefits of weight loss discussed] : Benefits of weight loss discussed [Encouraged to maintain food diary] : Encouraged to maintain food diary [Structured Weight Management Program suggested:] : Structured weight management program suggested [Encouraged to use exercise tracking device] : Encouraged to use exercise tracking device [Encouraged to increase physical activity] : Encouraged to increase physical activity [Weigh Self Weekly] : weigh self weekly [Decrease Portions] : decrease portions [____ min/wk Activity] : [unfilled] min/wk activity [Good understanding] : Patient has a good understanding of disease, goals and obesity follow-up plan

## 2022-08-14 NOTE — COUNSELING
[Potential consequences of obesity discussed] : Potential consequences of obesity discussed [Benefits of weight loss discussed] : Benefits of weight loss discussed [Structured Weight Management Program suggested:] : Structured weight management program suggested [Encouraged to maintain food diary] : Encouraged to maintain food diary [Encouraged to increase physical activity] : Encouraged to increase physical activity [Encouraged to use exercise tracking device] : Encouraged to use exercise tracking device [Weigh Self Weekly] : weigh self weekly [Good understanding] : Patient has a good understanding of disease, goals and obesity follow-up plan

## 2022-08-14 NOTE — HISTORY OF PRESENT ILLNESS
[Home] : at home, [unfilled] , at the time of the visit. [Medical Office: ([facility])___] : at the medical office located in  [Verbal consent obtained from patient] : the patient, [unfilled] [de-identified] : 35 y/o female patient seen today via telehealth visit:\par - f/u weight management/obesity - patient currently taking Phentermine 37.5mg and Topamax 100mg, and currently on Ozempic 0.25mg weekly, \par  Weight loss since last visit: 4 lbs, Total weight loss 43 lbs \par \par

## 2022-08-31 ENCOUNTER — APPOINTMENT (OUTPATIENT)
Dept: INTERNAL MEDICINE | Facility: CLINIC | Age: 37
End: 2022-08-31

## 2022-08-31 VITALS — WEIGHT: 157 LBS | BODY MASS INDEX: 28.89 KG/M2 | HEIGHT: 62 IN

## 2022-08-31 PROCEDURE — 99214 OFFICE O/P EST MOD 30 MIN: CPT | Mod: 95

## 2022-08-31 RX ORDER — AMOXICILLIN 500 MG/1
500 CAPSULE ORAL
Qty: 4 | Refills: 0 | Status: DISCONTINUED | COMMUNITY
Start: 2022-08-09 | End: 2022-08-31

## 2022-08-31 RX ORDER — PHENTERMINE HYDROCHLORIDE 37.5 MG/1
37.5 CAPSULE ORAL
Qty: 30 | Refills: 0 | Status: DISCONTINUED | COMMUNITY
Start: 2020-10-21 | End: 2022-08-31

## 2022-09-01 NOTE — HISTORY OF PRESENT ILLNESS
[Home] : at home, [unfilled] , at the time of the visit. [Medical Office: ([facility])___] : at the medical office located in  [Verbal consent obtained from patient] : the patient, [unfilled] [de-identified] : 35 y/o female patient seen today via telehealth visit:\par - f/u weight management/obesity - patient currently taking Phentermine 37.5mg, and currently on Ozempic 0.25mg weekly, denies any side effects, states has been eating balanced diet, still physically active as tolerated \par  Weight loss since last visit: +1 lbs, Total weight loss 42 lbs \par \par

## 2022-10-26 ENCOUNTER — APPOINTMENT (OUTPATIENT)
Dept: INTERNAL MEDICINE | Facility: CLINIC | Age: 37
End: 2022-10-26
Payer: COMMERCIAL

## 2022-10-26 VITALS
SYSTOLIC BLOOD PRESSURE: 120 MMHG | WEIGHT: 168 LBS | DIASTOLIC BLOOD PRESSURE: 83 MMHG | HEIGHT: 62 IN | HEART RATE: 93 BPM | OXYGEN SATURATION: 99 % | BODY MASS INDEX: 30.91 KG/M2 | TEMPERATURE: 98 F

## 2022-10-26 DIAGNOSIS — Z30.430 ENCOUNTER FOR INSERTION OF INTRAUTERINE CONTRACEPTIVE DEVICE: ICD-10-CM

## 2022-10-26 DIAGNOSIS — E66.3 OVERWEIGHT: ICD-10-CM

## 2022-10-26 DIAGNOSIS — N94.6 DYSMENORRHEA, UNSPECIFIED: ICD-10-CM

## 2022-10-26 DIAGNOSIS — E55.9 VITAMIN D DEFICIENCY, UNSPECIFIED: ICD-10-CM

## 2022-10-26 PROCEDURE — 36415 COLL VENOUS BLD VENIPUNCTURE: CPT

## 2022-10-26 PROCEDURE — 99213 OFFICE O/P EST LOW 20 MIN: CPT | Mod: 25

## 2022-10-26 PROCEDURE — 99395 PREV VISIT EST AGE 18-39: CPT | Mod: 25

## 2022-10-26 PROCEDURE — G0447 BEHAVIOR COUNSEL OBESITY 15M: CPT

## 2022-10-26 PROCEDURE — G0444 DEPRESSION SCREEN ANNUAL: CPT | Mod: 59

## 2022-11-03 ENCOUNTER — OUTPATIENT (OUTPATIENT)
Dept: OUTPATIENT SERVICES | Facility: HOSPITAL | Age: 37
LOS: 1 days | End: 2022-11-03
Payer: COMMERCIAL

## 2022-11-03 ENCOUNTER — APPOINTMENT (OUTPATIENT)
Dept: ULTRASOUND IMAGING | Facility: CLINIC | Age: 37
End: 2022-11-03

## 2022-11-03 DIAGNOSIS — N94.6 DYSMENORRHEA, UNSPECIFIED: ICD-10-CM

## 2022-11-03 PROCEDURE — 76830 TRANSVAGINAL US NON-OB: CPT

## 2022-11-03 PROCEDURE — 76856 US EXAM PELVIC COMPLETE: CPT

## 2022-11-03 PROCEDURE — 76856 US EXAM PELVIC COMPLETE: CPT | Mod: 26

## 2022-11-03 PROCEDURE — 76830 TRANSVAGINAL US NON-OB: CPT | Mod: 26

## 2022-11-04 ENCOUNTER — NON-APPOINTMENT (OUTPATIENT)
Age: 37
End: 2022-11-04

## 2022-11-04 LAB
25(OH)D3 SERPL-MCNC: 30.4 NG/ML
ALBUMIN SERPL ELPH-MCNC: 4.1 G/DL
ALP BLD-CCNC: 69 U/L
ALT SERPL-CCNC: 29 U/L
ANION GAP SERPL CALC-SCNC: 10 MMOL/L
APPEARANCE: CLEAR
AST SERPL-CCNC: 21 U/L
BACTERIA: NEGATIVE
BASOPHILS # BLD AUTO: 0.05 K/UL
BASOPHILS NFR BLD AUTO: 0.6 %
BILIRUB SERPL-MCNC: 0.6 MG/DL
BILIRUBIN URINE: NEGATIVE
BLOOD URINE: NEGATIVE
BUN SERPL-MCNC: 13 MG/DL
CALCIUM SERPL-MCNC: 9.2 MG/DL
CHLORIDE SERPL-SCNC: 104 MMOL/L
CHOLEST SERPL-MCNC: 150 MG/DL
CO2 SERPL-SCNC: 23 MMOL/L
COLOR: NORMAL
CREAT SERPL-MCNC: 0.74 MG/DL
EGFR: 107 ML/MIN/1.73M2
EOSINOPHIL # BLD AUTO: 0.07 K/UL
EOSINOPHIL NFR BLD AUTO: 0.8 %
ESTIMATED AVERAGE GLUCOSE: 82 MG/DL
GLUCOSE QUALITATIVE U: NEGATIVE
GLUCOSE SERPL-MCNC: 89 MG/DL
HBA1C MFR BLD HPLC: 4.5 %
HCT VFR BLD CALC: 37.5 %
HDLC SERPL-MCNC: 49 MG/DL
HGB BLD-MCNC: 12.3 G/DL
HYALINE CASTS: 0 /LPF
IMM GRANULOCYTES NFR BLD AUTO: 0.2 %
IRON SATN MFR SERPL: 18 %
IRON SERPL-MCNC: 60 UG/DL
KETONES URINE: NEGATIVE
LDLC SERPL CALC-MCNC: 86 MG/DL
LEUKOCYTE ESTERASE URINE: ABNORMAL
LYMPHOCYTES # BLD AUTO: 3.41 K/UL
LYMPHOCYTES NFR BLD AUTO: 38.8 %
MAN DIFF?: NORMAL
MCHC RBC-ENTMCNC: 31.9 PG
MCHC RBC-ENTMCNC: 32.8 GM/DL
MCV RBC AUTO: 97.2 FL
MICROSCOPIC-UA: NORMAL
MONOCYTES # BLD AUTO: 0.47 K/UL
MONOCYTES NFR BLD AUTO: 5.4 %
NEUTROPHILS # BLD AUTO: 4.76 K/UL
NEUTROPHILS NFR BLD AUTO: 54.2 %
NITRITE URINE: NEGATIVE
NONHDLC SERPL-MCNC: 101 MG/DL
PH URINE: 6.5
PLATELET # BLD AUTO: 254 K/UL
POTASSIUM SERPL-SCNC: 4.2 MMOL/L
PROT SERPL-MCNC: 6.6 G/DL
PROTEIN URINE: NEGATIVE
RBC # BLD: 3.86 M/UL
RBC # FLD: 12.9 %
RED BLOOD CELLS URINE: 3 /HPF
SODIUM SERPL-SCNC: 137 MMOL/L
SPECIFIC GRAVITY URINE: 1.01
SQUAMOUS EPITHELIAL CELLS: 2 /HPF
TIBC SERPL-MCNC: 333 UG/DL
TRIGL SERPL-MCNC: 78 MG/DL
TSH SERPL-ACNC: 1.63 UIU/ML
UIBC SERPL-MCNC: 273 UG/DL
UROBILINOGEN URINE: NORMAL
WBC # FLD AUTO: 8.78 K/UL
WHITE BLOOD CELLS URINE: 18 /HPF

## 2022-11-11 ENCOUNTER — OUTPATIENT (OUTPATIENT)
Dept: OUTPATIENT SERVICES | Facility: HOSPITAL | Age: 37
LOS: 1 days | End: 2022-11-11
Payer: COMMERCIAL

## 2022-11-11 ENCOUNTER — APPOINTMENT (OUTPATIENT)
Dept: MRI IMAGING | Facility: CLINIC | Age: 37
End: 2022-11-11

## 2022-11-11 DIAGNOSIS — Z00.8 ENCOUNTER FOR OTHER GENERAL EXAMINATION: ICD-10-CM

## 2022-11-11 PROCEDURE — 72197 MRI PELVIS W/O & W/DYE: CPT | Mod: 26

## 2022-11-11 PROCEDURE — A9585: CPT

## 2022-11-11 PROCEDURE — 72197 MRI PELVIS W/O & W/DYE: CPT

## 2022-11-14 ENCOUNTER — APPOINTMENT (OUTPATIENT)
Dept: OBGYN | Facility: CLINIC | Age: 37
End: 2022-11-14

## 2022-11-14 VITALS
SYSTOLIC BLOOD PRESSURE: 122 MMHG | WEIGHT: 168 LBS | BODY MASS INDEX: 30.91 KG/M2 | DIASTOLIC BLOOD PRESSURE: 70 MMHG | HEIGHT: 62 IN

## 2022-11-14 PROCEDURE — 99213 OFFICE O/P EST LOW 20 MIN: CPT

## 2022-11-16 NOTE — PLAN
[FreeTextEntry1] : IUD in endometrial canal with 1 arm partially penetrating into myometrium.  Minimal pain and bleeding.\par \par Offered removal replacement.\par Discussed possible decrease in efficacy with placement.\par Discussed possible cause of cramping.  \par \par Patient to consider options.  Does not desire removal at this time.

## 2022-11-20 PROBLEM — E66.3 OVERWEIGHT: Noted: 2022-05-16

## 2022-11-20 NOTE — HISTORY OF PRESENT ILLNESS
[de-identified] : 38 y/o female patient presents today for annual CPE/fasting labs\par - HCM: last PAP 09/2021 \par - f/u weight management/obesity - patient currently on Ozempic 0.5mg weekly, denies any side effects, states has been eating balanced diet, still physically active as tolerated \par  Weight loss since last visit: 0 lbs, Total weight loss 42 lbs \par \par

## 2022-11-20 NOTE — HEALTH RISK ASSESSMENT
[Never] : Never [Yes] : Yes [No] : In the past 12 months have you used drugs other than those required for medical reasons? No [0] : 2) Feeling down, depressed, or hopeless: Not at all (0) [PHQ-2 Negative - No further assessment needed] : PHQ-2 Negative - No further assessment needed [I have developed a follow-up plan documented below in the note.] : I have developed a follow-up plan documented below in the note. [[id number]] : 0 [Patient reported PAP Smear was normal] : Patient reported PAP Smear was normal [Change in mental status noted] : No change in mental status noted [None] : None [With Family] : lives with family [Employed] : employed [# Of Children ___] : has [unfilled] children [Sexually Active] : sexually active [High Risk Behavior] : no high risk behavior [Feels Safe at Home] : Feels safe at home [Reports changes in hearing] : Reports no changes in hearing [Reports changes in vision] : Reports no changes in vision [Reports changes in dental health] : Reports no changes in dental health [PapSmearDate] : 09/2021

## 2023-01-27 ENCOUNTER — APPOINTMENT (OUTPATIENT)
Dept: INTERNAL MEDICINE | Facility: CLINIC | Age: 38
End: 2023-01-27
Payer: COMMERCIAL

## 2023-01-27 VITALS — WEIGHT: 168 LBS | BODY MASS INDEX: 30.91 KG/M2 | HEIGHT: 62 IN

## 2023-01-27 DIAGNOSIS — T83.32XA DISPLACEMENT OF INTRAUTERINE CONTRACEPTIVE DEVICE, INITIAL ENCOUNTER: ICD-10-CM

## 2023-01-27 PROCEDURE — 99214 OFFICE O/P EST MOD 30 MIN: CPT | Mod: 95

## 2023-01-27 NOTE — HISTORY OF PRESENT ILLNESS
[Home] : at home, [unfilled] , at the time of the visit. [Medical Office: ([facility])___] : at the medical office located in  [Verbal consent obtained from patient] : the patient, [unfilled] [de-identified] : 36 y/o female patient seen today via telehealth visit:\par - f/u weight management/obesity - patient currently on Ozempic 1 mg weekly, denies any side effects, states has been eating balanced diet, still physically active as tolerated, did not lose any weight since October, feels current dosage not helping \par  Weight loss since last visit: 0 lbs, Total weight loss 42 lbs \par \par

## 2023-01-31 ENCOUNTER — APPOINTMENT (OUTPATIENT)
Dept: OBGYN | Facility: CLINIC | Age: 38
End: 2023-01-31
Payer: COMMERCIAL

## 2023-01-31 VITALS
SYSTOLIC BLOOD PRESSURE: 110 MMHG | HEIGHT: 62 IN | BODY MASS INDEX: 30.91 KG/M2 | WEIGHT: 168 LBS | DIASTOLIC BLOOD PRESSURE: 68 MMHG

## 2023-01-31 PROCEDURE — 99395 PREV VISIT EST AGE 18-39: CPT

## 2023-01-31 PROCEDURE — 36415 COLL VENOUS BLD VENIPUNCTURE: CPT

## 2023-01-31 NOTE — END OF VISIT
Loy Walker is doing a great job with his diabetes  Using Control IQ program very effectively  1  Same insulin settings, although we increased max basal limit, and I advise trying to use standard boluses letting insulin all go in at once, rather than delayed boluses based on CGM spikes  --Basal: ([place]) 1 0  --Correction Factor: ([place]) 1 to lower 60 mg/dL   --Carb ([place]) 1 unit per 6 grams CHO  --Target ([place]) 110 mg/dL  2  A1c today is 7 1%, great!  3  Yearly screening labs not due until Dec 2021  4   Follow up in three months, but call with any issues or problems [Time Spent: ___ minutes] : I have spent [unfilled] minutes of time on the encounter.

## 2023-01-31 NOTE — HISTORY OF PRESENT ILLNESS
[No] : Patient does not have concerns regarding sex [Currently Active] : currently active [Men] : men [Vaginal] : vaginal [Patient would like to be screened for STIs] : Patient would like to be screened for STIs [FreeTextEntry1] : 38yo P1 LMP 1/23/23 here for annual exam.  Reports episodic pain after intercourse as well as spotting after intercourse w/one episode of heavy bleeding.  s/p MRI showing L arm embedded in myometrium w/o perforation.

## 2023-01-31 NOTE — DISCUSSION/SUMMARY
[FreeTextEntry1] : - Pap/HPV obtained today\par - STI testing done\par - Contraceptive options reviewed; discussed different forms of contraception and also removal and replacement of paragard since strings are visible\par - Also discussed possible hysteroscopy if IUD retained\par - will notify office of her decision\par \par \par

## 2023-02-07 LAB
C TRACH RRNA SPEC QL NAA+PROBE: NOT DETECTED
HBV SURFACE AG SER QL: NONREACTIVE
HCV AB SER QL: NONREACTIVE
HCV S/CO RATIO: 0.18 S/CO
HIV1+2 AB SPEC QL IA.RAPID: NONREACTIVE
HPV HIGH+LOW RISK DNA PNL CVX: DETECTED
N GONORRHOEA RRNA SPEC QL NAA+PROBE: NOT DETECTED
SOURCE AMPLIFICATION: NORMAL
SOURCE TP AMPLIFICATION: NORMAL
T PALLIDUM AB SER QL IA: NEGATIVE
T VAGINALIS RRNA SPEC QL NAA+PROBE: NOT DETECTED

## 2023-02-10 LAB — CYTOLOGY CVX/VAG DOC THIN PREP: ABNORMAL

## 2023-02-15 ENCOUNTER — NON-APPOINTMENT (OUTPATIENT)
Age: 38
End: 2023-02-15

## 2023-04-05 ENCOUNTER — APPOINTMENT (OUTPATIENT)
Dept: OBGYN | Facility: CLINIC | Age: 38
End: 2023-04-05
Payer: COMMERCIAL

## 2023-04-05 VITALS
WEIGHT: 161 LBS | DIASTOLIC BLOOD PRESSURE: 62 MMHG | HEIGHT: 62 IN | BODY MASS INDEX: 29.63 KG/M2 | SYSTOLIC BLOOD PRESSURE: 110 MMHG

## 2023-04-05 PROCEDURE — 57454 BX/CURETT OF CERVIX W/SCOPE: CPT

## 2023-04-05 PROCEDURE — 81025 URINE PREGNANCY TEST: CPT

## 2023-04-05 NOTE — PROCEDURE
[Colposcopy] : Colposcopy  [Time out performed] : Pre-procedure time out performed.  Patient's name, date of birth and procedure confirmed. [Consent Obtained] : Consent obtained [Risks] : risks [Benefits] : benefits [Alternatives] : alternatives [Patient] : patient [Infection] : infection [Bleeding] : bleeding [Allergic Reaction] : allergic reaction [ASCUS] : ASCUS [HPV High Risk] : HPV high risk [ECC Performed] : ECC performed [No Abnormalities] : no abnormalities [Biopsy] : biopsy taken [Hemostasis Obtained] : Hemostasis obtained [Tolerated Well] : the patient tolerated the procedure well [de-identified] : 1 [de-identified] : silver nitrate [de-identified] : 11 o clock

## 2023-04-19 ENCOUNTER — APPOINTMENT (OUTPATIENT)
Dept: INTERNAL MEDICINE | Facility: CLINIC | Age: 38
End: 2023-04-19
Payer: COMMERCIAL

## 2023-04-19 VITALS — WEIGHT: 160 LBS | HEIGHT: 62 IN | BODY MASS INDEX: 29.44 KG/M2

## 2023-04-19 DIAGNOSIS — E28.2 POLYCYSTIC OVARIAN SYNDROME: ICD-10-CM

## 2023-04-19 DIAGNOSIS — E66.09 OTHER OBESITY DUE TO EXCESS CALORIES: ICD-10-CM

## 2023-04-19 PROCEDURE — 99214 OFFICE O/P EST MOD 30 MIN: CPT | Mod: 25,95

## 2023-04-19 NOTE — HISTORY OF PRESENT ILLNESS
[Home] : at home, [unfilled] , at the time of the visit. [Medical Office: ([facility])___] : at the medical office located in  [Verbal consent obtained from patient] : the patient, [unfilled] [de-identified] : 38 y/o female patient seen today via telehealth visit:\par - f/u weight management/obesity - patient currently on Ozempic 2 mg weekly, denies any side effects, states has been eating balanced diet, still physically active as tolerated\par  Weight loss since last visit: 8 lbs, Total weight loss 50 lbs \par \par

## 2023-04-21 LAB
CORE LAB BIOPSY: NORMAL
HCG UR QL: NEGATIVE
QUALITY CONTROL: YES

## 2023-04-26 ENCOUNTER — NON-APPOINTMENT (OUTPATIENT)
Age: 38
End: 2023-04-26

## 2023-07-05 ENCOUNTER — TRANSCRIPTION ENCOUNTER (OUTPATIENT)
Age: 38
End: 2023-07-05

## 2023-07-05 ENCOUNTER — APPOINTMENT (OUTPATIENT)
Dept: OBGYN | Facility: CLINIC | Age: 38
End: 2023-07-05
Payer: COMMERCIAL

## 2023-07-05 VITALS
SYSTOLIC BLOOD PRESSURE: 118 MMHG | DIASTOLIC BLOOD PRESSURE: 60 MMHG | BODY MASS INDEX: 29.44 KG/M2 | HEIGHT: 62 IN | WEIGHT: 160 LBS

## 2023-07-05 PROCEDURE — 81003 URINALYSIS AUTO W/O SCOPE: CPT | Mod: QW

## 2023-07-05 PROCEDURE — 99213 OFFICE O/P EST LOW 20 MIN: CPT | Mod: 25

## 2023-07-05 PROCEDURE — 36415 COLL VENOUS BLD VENIPUNCTURE: CPT

## 2023-07-05 NOTE — PHYSICAL EXAM
[Appropriately responsive] : appropriately responsive [Alert] : alert [No Acute Distress] : no acute distress [Soft] : soft [Non-tender] : non-tender [Non-distended] : non-distended [No HSM] : No HSM [No Lesions] : no lesions [No Mass] : no mass [Oriented x3] : oriented x3 [Labia Majora] : normal [Labia Minora] : normal [Discharge] : a  ~M vaginal discharge was present [Moderate] : moderate [IUD String] : an IUD string was noted [Normal] : normal [Uterine Adnexae] : normal

## 2023-07-05 NOTE — COUNSELING
[Contraception/ Emergency Contraception/ Safe Sexual Practices] : contraception, emergency contraception, safe sexual practices [STD (testing, results, tx)] : STD (testing, results, tx) [FreeTextEntry2] : Vulvar Hygiene

## 2023-07-05 NOTE — DISCUSSION/SUMMARY
[FreeTextEntry1] : - macrobid sent for empiric treatment of UTI\par - Urine culture sent\par - Affirm obtained\par - GC/Chlam testing done\par - Vulvar hygiene reviewed\par - STI testing done\par - Referral for pelvic sono placed secondary to pelvic pain

## 2023-07-06 LAB
C TRACH RRNA SPEC QL NAA+PROBE: NOT DETECTED
CANDIDA VAG CYTO: NOT DETECTED
G VAGINALIS+PREV SP MTYP VAG QL MICRO: DETECTED
HBV SURFACE AG SER QL: NONREACTIVE
HCV AB SER QL: NONREACTIVE
HCV S/CO RATIO: 0.2 S/CO
HIV1+2 AB SPEC QL IA.RAPID: NONREACTIVE
N GONORRHOEA RRNA SPEC QL NAA+PROBE: NOT DETECTED
SOURCE AMPLIFICATION: NORMAL
SOURCE AMPLIFICATION: NORMAL
T PALLIDUM AB SER QL IA: NEGATIVE
T VAGINALIS RRNA SPEC QL NAA+PROBE: NOT DETECTED
T VAGINALIS VAG QL WET PREP: NOT DETECTED

## 2023-07-07 LAB — BACTERIA UR CULT: NORMAL

## 2023-07-07 RX ORDER — METRONIDAZOLE 500 MG/1
500 TABLET ORAL TWICE DAILY
Qty: 14 | Refills: 0 | Status: DISCONTINUED | COMMUNITY
Start: 2023-07-06 | End: 2023-07-07

## 2023-07-10 NOTE — OCCUPATIONAL THERAPY INITIAL EVALUATION ADULT - HEALTH SCREEN CRITERIA
yes
Go for blood tests as directed. Your doctor will do lab tests at regular visits to monitor the effects of this medicine. Please follow up with your doctor and keep your health care provider appointments.

## 2023-07-24 ENCOUNTER — APPOINTMENT (OUTPATIENT)
Dept: ULTRASOUND IMAGING | Facility: CLINIC | Age: 38
End: 2023-07-24
Payer: COMMERCIAL

## 2023-07-24 PROCEDURE — 76856 US EXAM PELVIC COMPLETE: CPT

## 2023-07-28 ENCOUNTER — APPOINTMENT (OUTPATIENT)
Dept: INTERNAL MEDICINE | Facility: CLINIC | Age: 38
End: 2023-07-28
Payer: COMMERCIAL

## 2023-07-28 VITALS — WEIGHT: 156 LBS | BODY MASS INDEX: 28.71 KG/M2 | HEIGHT: 62 IN

## 2023-07-28 DIAGNOSIS — E11.9 TYPE 2 DIABETES MELLITUS W/OUT COMPLICATIONS: ICD-10-CM

## 2023-07-28 DIAGNOSIS — Z76.89 PERSONS ENCOUNTERING HEALTH SERVICES IN OTHER SPECIFIED CIRCUMSTANCES: ICD-10-CM

## 2023-07-28 PROCEDURE — G0447 BEHAVIOR COUNSEL OBESITY 15M: CPT | Mod: 59,95

## 2023-07-28 PROCEDURE — 99214 OFFICE O/P EST MOD 30 MIN: CPT | Mod: 25,95

## 2023-07-28 NOTE — HISTORY OF PRESENT ILLNESS
[Home] : at home, [unfilled] , at the time of the visit. [Medical Office: ([facility])___] : at the medical office located in  [Verbal consent obtained from patient] : the patient, [unfilled] [de-identified] : 37 y/o female patient seen today via telehealth visit:\par - f/u weight management/obesity - patient currently on Ozempic 2 mg weekly, denies any side effects, states has been eating balanced diet, still physically active as tolerated\par  Weight loss since last visit: 4 lbs, Total weight loss 46 lbs \par \par

## 2023-10-23 NOTE — ASU PREOP CHECKLIST - TO WHOM
Met with patient to discuss transitional planning. She is agreeable to go to SNF. List provided from insurance website along with list with CMS ratings    [id number] received choices from patient. Referrals sent to [facility], [address] Other choices are [employer] and [address] [facility], [facility], [address]    1800 received call from [place] from [place] and [place]. They are able to accept patient at either location. Patient notified. She would like [place].  Precert submitted in [employer] portal. Auth ID [id number] Aminata

## 2023-10-27 ENCOUNTER — APPOINTMENT (OUTPATIENT)
Dept: INTERNAL MEDICINE | Facility: CLINIC | Age: 38
End: 2023-10-27
Payer: COMMERCIAL

## 2023-10-27 VITALS
SYSTOLIC BLOOD PRESSURE: 121 MMHG | HEIGHT: 62 IN | TEMPERATURE: 98 F | BODY MASS INDEX: 29.63 KG/M2 | DIASTOLIC BLOOD PRESSURE: 85 MMHG | HEART RATE: 79 BPM | WEIGHT: 161 LBS | OXYGEN SATURATION: 100 % | RESPIRATION RATE: 14 BRPM

## 2023-10-27 DIAGNOSIS — F41.1 GENERALIZED ANXIETY DISORDER: ICD-10-CM

## 2023-10-27 DIAGNOSIS — Z01.419 ENCOUNTER FOR GYNECOLOGICAL EXAMINATION (GENERAL) (ROUTINE) W/OUT ABNORMAL FINDINGS: ICD-10-CM

## 2023-10-27 DIAGNOSIS — E66.3 OVERWEIGHT: ICD-10-CM

## 2023-10-27 DIAGNOSIS — Z00.00 ENCOUNTER FOR GENERAL ADULT MEDICAL EXAMINATION W/OUT ABNORMAL FINDINGS: ICD-10-CM

## 2023-10-27 DIAGNOSIS — Z51.81 ENCOUNTER FOR THERAPEUTIC DRUG LVL MONITORING: ICD-10-CM

## 2023-10-27 DIAGNOSIS — B96.89 ACUTE VAGINITIS: ICD-10-CM

## 2023-10-27 DIAGNOSIS — Z87.42 PERSONAL HISTORY OF OTHER DISEASES OF THE FEMALE GENITAL TRACT: ICD-10-CM

## 2023-10-27 DIAGNOSIS — Z11.3 ENCOUNTER FOR SCREENING FOR INFECTIONS WITH A PREDOMINANTLY SEXUAL MODE OF TRANSMISSION: ICD-10-CM

## 2023-10-27 DIAGNOSIS — Z87.898 PERSONAL HISTORY OF OTHER SPECIFIED CONDITIONS: ICD-10-CM

## 2023-10-27 DIAGNOSIS — G47.00 INSOMNIA, UNSPECIFIED: ICD-10-CM

## 2023-10-27 DIAGNOSIS — R10.2 PELVIC AND PERINEAL PAIN: ICD-10-CM

## 2023-10-27 DIAGNOSIS — N76.0 ACUTE VAGINITIS: ICD-10-CM

## 2023-10-27 DIAGNOSIS — L65.9 NONSCARRING HAIR LOSS, UNSPECIFIED: ICD-10-CM

## 2023-10-27 PROCEDURE — G0447 BEHAVIOR COUNSEL OBESITY 15M: CPT | Mod: 59

## 2023-10-27 PROCEDURE — 99395 PREV VISIT EST AGE 18-39: CPT | Mod: 25

## 2023-10-27 PROCEDURE — 36415 COLL VENOUS BLD VENIPUNCTURE: CPT

## 2023-10-27 PROCEDURE — G0444 DEPRESSION SCREEN ANNUAL: CPT | Mod: 59

## 2023-10-27 RX ORDER — TRIAMCINOLONE ACETONIDE 1 MG/G
0.1 CREAM TOPICAL
Qty: 1 | Refills: 0 | Status: DISCONTINUED | COMMUNITY
Start: 2021-03-17 | End: 2023-10-27

## 2023-10-27 RX ORDER — NITROFURANTOIN (MONOHYDRATE/MACROCRYSTALS) 25; 75 MG/1; MG/1
100 CAPSULE ORAL
Qty: 14 | Refills: 0 | Status: DISCONTINUED | COMMUNITY
Start: 2023-07-05 | End: 2023-10-27

## 2023-10-27 RX ORDER — CLINDAMYCIN HYDROCHLORIDE 300 MG/1
300 CAPSULE ORAL
Qty: 14 | Refills: 0 | Status: DISCONTINUED | COMMUNITY
Start: 2023-07-07 | End: 2023-10-27

## 2023-10-27 RX ORDER — BUPROPION HYDROCHLORIDE 150 MG/1
150 TABLET, EXTENDED RELEASE ORAL
Qty: 90 | Refills: 1 | Status: DISCONTINUED | COMMUNITY
Start: 2022-05-16 | End: 2023-10-27

## 2023-10-27 RX ORDER — FLUCONAZOLE 150 MG/1
150 TABLET ORAL
Qty: 2 | Refills: 0 | Status: DISCONTINUED | COMMUNITY
Start: 2023-07-11 | End: 2023-10-27

## 2023-10-27 RX ORDER — MELOXICAM 15 MG/1
15 TABLET ORAL
Qty: 30 | Refills: 0 | Status: DISCONTINUED | COMMUNITY
Start: 2021-04-07 | End: 2023-10-27

## 2023-10-27 RX ORDER — OMEPRAZOLE MAGNESIUM 20 MG/1
20 CAPSULE, DELAYED RELEASE ORAL
Refills: 0 | Status: DISCONTINUED | COMMUNITY
End: 2023-10-27

## 2023-11-16 ENCOUNTER — TRANSCRIPTION ENCOUNTER (OUTPATIENT)
Age: 38
End: 2023-11-16

## 2023-11-16 LAB
25(OH)D3 SERPL-MCNC: 34.3 NG/ML
ALBUMIN SERPL ELPH-MCNC: 4.1 G/DL
ALP BLD-CCNC: 69 U/L
ALT SERPL-CCNC: 30 U/L
ANION GAP SERPL CALC-SCNC: 13 MMOL/L
APPEARANCE: ABNORMAL
AST SERPL-CCNC: 22 U/L
BACTERIA: NEGATIVE /HPF
BASOPHILS # BLD AUTO: 0.05 K/UL
BASOPHILS NFR BLD AUTO: 0.8 %
BILIRUB SERPL-MCNC: 0.6 MG/DL
BILIRUBIN URINE: NEGATIVE
BLOOD URINE: ABNORMAL
BUN SERPL-MCNC: 11 MG/DL
CALCIUM OXALATE CRYSTALS: PRESENT
CALCIUM SERPL-MCNC: 9.3 MG/DL
CAST: 0 /LPF
CHLORIDE SERPL-SCNC: 108 MMOL/L
CHOLEST SERPL-MCNC: 149 MG/DL
CO2 SERPL-SCNC: 20 MMOL/L
COLOR: NORMAL
CREAT SERPL-MCNC: 0.7 MG/DL
EGFR: 113 ML/MIN/1.73M2
EOSINOPHIL # BLD AUTO: 0.08 K/UL
EOSINOPHIL NFR BLD AUTO: 1.2 %
EPITHELIAL CELLS: 4 /HPF
ESTIMATED AVERAGE GLUCOSE: 88 MG/DL
GLUCOSE QUALITATIVE U: NEGATIVE MG/DL
GLUCOSE SERPL-MCNC: 80 MG/DL
HBA1C MFR BLD HPLC: 4.7 %
HCT VFR BLD CALC: 39.3 %
HDLC SERPL-MCNC: 53 MG/DL
HGB BLD-MCNC: 13.2 G/DL
IMM GRANULOCYTES NFR BLD AUTO: 0.3 %
KETONES URINE: NEGATIVE MG/DL
LDLC SERPL CALC-MCNC: 80 MG/DL
LEUKOCYTE ESTERASE URINE: ABNORMAL
LYMPHOCYTES # BLD AUTO: 3 K/UL
LYMPHOCYTES NFR BLD AUTO: 45.1 %
MAN DIFF?: NORMAL
MCHC RBC-ENTMCNC: 31.7 PG
MCHC RBC-ENTMCNC: 33.6 GM/DL
MCV RBC AUTO: 94.2 FL
MICROSCOPIC-UA: NORMAL
MONOCYTES # BLD AUTO: 0.34 K/UL
MONOCYTES NFR BLD AUTO: 5.1 %
NEUTROPHILS # BLD AUTO: 3.16 K/UL
NEUTROPHILS NFR BLD AUTO: 47.5 %
NITRITE URINE: NEGATIVE
NONHDLC SERPL-MCNC: 96 MG/DL
PH URINE: 5.5
PLATELET # BLD AUTO: 249 K/UL
POTASSIUM SERPL-SCNC: 4.6 MMOL/L
PROT SERPL-MCNC: 6.9 G/DL
PROTEIN URINE: 30 MG/DL
RBC # BLD: 4.17 M/UL
RBC # FLD: 12.3 %
RED BLOOD CELLS URINE: 15 /HPF
REVIEW: NORMAL
SODIUM SERPL-SCNC: 141 MMOL/L
SPECIFIC GRAVITY URINE: >1.03
TRIGL SERPL-MCNC: 80 MG/DL
TSH SERPL-ACNC: 1.91 UIU/ML
UROBILINOGEN URINE: 1 MG/DL
WBC # FLD AUTO: 6.65 K/UL
WHITE BLOOD CELLS URINE: 2 /HPF

## 2023-11-16 RX ORDER — TRAZODONE HYDROCHLORIDE 50 MG/1
50 TABLET ORAL
Qty: 90 | Refills: 3 | Status: ACTIVE | COMMUNITY
Start: 2023-10-27 | End: 1900-01-01

## 2023-11-21 ENCOUNTER — NON-APPOINTMENT (OUTPATIENT)
Age: 38
End: 2023-11-21

## 2023-11-21 ENCOUNTER — APPOINTMENT (OUTPATIENT)
Dept: OBGYN | Facility: CLINIC | Age: 38
End: 2023-11-21

## 2023-12-08 ENCOUNTER — TRANSCRIPTION ENCOUNTER (OUTPATIENT)
Age: 38
End: 2023-12-08

## 2023-12-08 RX ORDER — ONDANSETRON 4 MG/1
4 TABLET, ORALLY DISINTEGRATING ORAL
Qty: 42 | Refills: 0 | Status: ACTIVE | COMMUNITY
Start: 2023-12-08 | End: 1900-01-01

## 2023-12-13 ENCOUNTER — APPOINTMENT (OUTPATIENT)
Dept: OBGYN | Facility: CLINIC | Age: 38
End: 2023-12-13
Payer: COMMERCIAL

## 2023-12-13 VITALS
DIASTOLIC BLOOD PRESSURE: 82 MMHG | WEIGHT: 161 LBS | SYSTOLIC BLOOD PRESSURE: 120 MMHG | BODY MASS INDEX: 29.63 KG/M2 | HEIGHT: 62 IN

## 2023-12-13 DIAGNOSIS — R87.810 ATYPICAL SQUAMOUS CELLS OF UNDETERMINED SIGNIFICANCE ON CYTOLOGIC SMEAR OF CERVIX (ASC-US): ICD-10-CM

## 2023-12-13 DIAGNOSIS — R87.610 ATYPICAL SQUAMOUS CELLS OF UNDETERMINED SIGNIFICANCE ON CYTOLOGIC SMEAR OF CERVIX (ASC-US): ICD-10-CM

## 2023-12-13 PROCEDURE — 57454 BX/CURETT OF CERVIX W/SCOPE: CPT

## 2023-12-13 PROCEDURE — 81025 URINE PREGNANCY TEST: CPT

## 2023-12-17 LAB
HCG UR QL: NEGATIVE
QUALITY CONTROL: YES

## 2023-12-22 PROBLEM — G47.00 INSOMNIA: Status: ACTIVE | Noted: 2021-01-22

## 2023-12-22 PROBLEM — E66.3 OVERWEIGHT: Status: ACTIVE | Noted: 2023-04-19

## 2023-12-22 PROBLEM — F41.1 GENERALIZED ANXIETY DISORDER: Status: ACTIVE | Noted: 2022-04-20

## 2023-12-22 NOTE — HEALTH RISK ASSESSMENT
Problem: Falls - Risk of:  Goal: Will remain free from falls  Description: Will remain free from falls  5/16/2020 1701 by Hernandez Menezes RN  Outcome: Met This Shift  5/16/2020 0424 by Grady Cunningham RN  Outcome: Ongoing  Goal: Absence of physical injury  Description: Absence of physical injury  5/16/2020 1701 by Hernandez Menezes RN  Outcome: Met This Shift  5/16/2020 0424 by Grady Cunningham RN  Outcome: Ongoing     Problem: Infection, Septic Shock:  Goal: Will show no infection signs and symptoms  Description: Will show no infection signs and symptoms  5/16/2020 1701 by Hernandez Menezse RN  Outcome: Met This Shift  5/16/2020 0424 by Grady Cunningham RN  Outcome: Ongoing     Problem: Anxiety/Stress:  Goal: Level of anxiety will decrease  Description: Level of anxiety will decrease  5/16/2020 1701 by Hernandez Menezes RN  Outcome: Met This Shift  5/16/2020 0424 by Grady Cunningham RN  Outcome: Ongoing [No] : In the past 12 months have you used drugs other than those required for medical reasons? No [0] : 2) Feeling down, depressed, or hopeless: Not at all (0) [PHQ-2 Negative - No further assessment needed] : PHQ-2 Negative - No further assessment needed [I have developed a follow-up plan documented below in the note.] : I have developed a follow-up plan documented below in the note. [INI8Oxdcx] : 0 [Patient reported PAP Smear was abnormal] : Patient reported PAP Smear was abnormal [Change in mental status noted] : No change in mental status noted [None] : None [With Family] : lives with family [Employed] : employed [# Of Children ___] : has [unfilled] children [Sexually Active] : sexually active [High Risk Behavior] : no high risk behavior [Feels Safe at Home] : Feels safe at home [Reports changes in hearing] : Reports no changes in hearing [Reports changes in vision] : Reports no changes in vision [Reports changes in dental health] : Reports no changes in dental health [PapSmearDate] : 1/2023 [Never] : Never

## 2023-12-22 NOTE — HISTORY OF PRESENT ILLNESS
[de-identified] : 39 y/o female patient presents today for annual CPE/fasting labs - reviewed age appropriate preventive screening exams - reviewed and updated PMH/Medications/Allergies/Surgical hx - f/u weight management/obesity - patient currently on Ozempic 2 mg weekly, denies any side effects, states has been eating balanced diet, still physically active as tolerated  Weight loss since last visit:  +5 lbs, Total weight loss 41 lbs

## 2024-01-04 LAB — CORE LAB BIOPSY: NORMAL

## 2024-01-31 ENCOUNTER — RX RENEWAL (OUTPATIENT)
Age: 39
End: 2024-01-31

## 2024-02-10 ENCOUNTER — NON-APPOINTMENT (OUTPATIENT)
Age: 39
End: 2024-02-10

## 2024-04-08 RX ORDER — ERGOCALCIFEROL 1.25 MG/1
1.25 MG CAPSULE ORAL
Qty: 15 | Refills: 1 | Status: ACTIVE | COMMUNITY
Start: 2020-10-20 | End: 1900-01-01

## 2024-04-18 ENCOUNTER — NON-APPOINTMENT (OUTPATIENT)
Age: 39
End: 2024-04-18

## 2024-04-22 RX ORDER — SEMAGLUTIDE 2.68 MG/ML
8 INJECTION, SOLUTION SUBCUTANEOUS
Qty: 1 | Refills: 0 | Status: ACTIVE | COMMUNITY
Start: 2022-05-17 | End: 1900-01-01

## 2024-05-21 ENCOUNTER — TRANSCRIPTION ENCOUNTER (OUTPATIENT)
Age: 39
End: 2024-05-21

## 2024-05-22 ENCOUNTER — APPOINTMENT (OUTPATIENT)
Dept: OTOLARYNGOLOGY | Facility: CLINIC | Age: 39
End: 2024-05-22
Payer: COMMERCIAL

## 2024-05-22 DIAGNOSIS — J31.0 CHRONIC RHINITIS: ICD-10-CM

## 2024-05-22 PROCEDURE — 99204 OFFICE O/P NEW MOD 45 MIN: CPT | Mod: 25

## 2024-05-22 PROCEDURE — 31231 NASAL ENDOSCOPY DX: CPT

## 2024-05-22 RX ORDER — LEVONORGESTREL 52 MG/1
20 INTRAUTERINE DEVICE INTRAUTERINE
Refills: 0 | Status: DISCONTINUED | COMMUNITY
Start: 2022-11-16 | End: 2024-05-22

## 2024-05-22 RX ORDER — FLUTICASONE PROPIONATE 50 UG/1
50 SPRAY, METERED NASAL
Qty: 1 | Refills: 6 | Status: ACTIVE | COMMUNITY
Start: 2024-05-22 | End: 1900-01-01

## 2024-05-22 RX ORDER — IPRATROPIUM BROMIDE 42 UG/1
0.06 SPRAY NASAL
Qty: 1 | Refills: 7 | Status: ACTIVE | COMMUNITY
Start: 2024-05-22 | End: 1900-01-01

## 2024-05-22 NOTE — HISTORY OF PRESENT ILLNESS
[de-identified] : 39 year old female presents for nasal congestion  1+ year of frequent nasal congestion, R>L, intermittent clear- watery anterior rhinorrhea (worse when head is tilted forward) Denies PND, facial pain/pressure, recurrent sinus infections Sense of smell is good Prior mild epistaxis in the winter- none recently Previously tried saline rinses but feels like the liquid gets stuck  Denies use of nasal sprays/OTC allergy medications No scans of sinuses  PMH: PCOS, pre DM, Reflux, anxiety PSH: hip replacement

## 2024-07-10 ENCOUNTER — APPOINTMENT (OUTPATIENT)
Dept: OBGYN | Facility: CLINIC | Age: 39
End: 2024-07-10
Payer: COMMERCIAL

## 2024-07-10 ENCOUNTER — LABORATORY RESULT (OUTPATIENT)
Age: 39
End: 2024-07-10

## 2024-07-10 VITALS
WEIGHT: 177 LBS | DIASTOLIC BLOOD PRESSURE: 76 MMHG | SYSTOLIC BLOOD PRESSURE: 120 MMHG | BODY MASS INDEX: 32.57 KG/M2 | HEIGHT: 62 IN

## 2024-07-10 DIAGNOSIS — Z01.419 ENCOUNTER FOR GYNECOLOGICAL EXAMINATION (GENERAL) (ROUTINE) W/OUT ABNORMAL FINDINGS: ICD-10-CM

## 2024-07-10 DIAGNOSIS — Z11.3 ENCOUNTER FOR SCREENING FOR INFECTIONS WITH A PREDOMINANTLY SEXUAL MODE OF TRANSMISSION: ICD-10-CM

## 2024-07-10 PROCEDURE — 99459 PELVIC EXAMINATION: CPT

## 2024-07-10 PROCEDURE — 36415 COLL VENOUS BLD VENIPUNCTURE: CPT

## 2024-07-10 PROCEDURE — 99395 PREV VISIT EST AGE 18-39: CPT

## 2024-07-16 ENCOUNTER — NON-APPOINTMENT (OUTPATIENT)
Age: 39
End: 2024-07-16

## 2024-07-16 DIAGNOSIS — N76.0 ACUTE VAGINITIS: ICD-10-CM

## 2024-07-16 DIAGNOSIS — B96.89 ACUTE VAGINITIS: ICD-10-CM

## 2024-07-16 LAB
C TRACH RRNA SPEC QL NAA+PROBE: NOT DETECTED
CYTOLOGY CVX/VAG DOC THIN PREP: ABNORMAL
HBV SURFACE AG SER QL: NONREACTIVE
HCV S/CO RATIO: 0.24 S/CO
HIV1+2 AB SPEC QL IA.RAPID: NONREACTIVE
HPV HIGH+LOW RISK DNA PNL CVX: NOT DETECTED
N GONORRHOEA RRNA SPEC QL NAA+PROBE: NOT DETECTED
T PALLIDUM AB SER QL IA: NEGATIVE

## 2024-07-16 RX ORDER — METRONIDAZOLE 500 MG/1
500 TABLET ORAL TWICE DAILY
Qty: 14 | Refills: 0 | Status: ACTIVE | COMMUNITY
Start: 2024-07-16 | End: 1900-01-01

## 2024-07-23 ENCOUNTER — APPOINTMENT (OUTPATIENT)
Dept: ORTHOPEDIC SURGERY | Facility: CLINIC | Age: 39
End: 2024-07-23
Payer: COMMERCIAL

## 2024-07-23 VITALS — HEIGHT: 60 IN | WEIGHT: 180 LBS | BODY MASS INDEX: 35.34 KG/M2

## 2024-07-23 DIAGNOSIS — M22.42 CHONDROMALACIA PATELLAE, LEFT KNEE: ICD-10-CM

## 2024-07-23 DIAGNOSIS — M25.569 PAIN IN UNSPECIFIED KNEE: ICD-10-CM

## 2024-07-23 DIAGNOSIS — Z96.642 PRESENCE OF LEFT ARTIFICIAL HIP JOINT: ICD-10-CM

## 2024-07-23 PROCEDURE — 73503 X-RAY EXAM HIP UNI 4/> VIEWS: CPT | Mod: LT

## 2024-07-23 PROCEDURE — 99204 OFFICE O/P NEW MOD 45 MIN: CPT

## 2024-07-23 PROCEDURE — 73562 X-RAY EXAM OF KNEE 3: CPT | Mod: LT

## 2024-07-23 NOTE — ASSESSMENT
[FreeTextEntry1] : Previous doc: 10/5/20: Advanced oa left hip. Symptoms are progressing and affecting everyday activities. Discussed surgical intervention. Went over risks, benefits, and recovery of a adis. Also went over the likelihood of her needing a revision in the future. Went over complications that could arise from having a adis at such an early age. Would need to review her MRI to eval for osteomyelitis. Will also order bloodwork to eval for underlying infection. Taking celbrex which gives mild relief. Will begin pt left hip. Advised her to try a cortisone injection left hip under fluro, which patient deferred. Patient would like to proceed with surgical intervention. 12/22/20: 2 weeks postop doing well. Knee pain is normal for course. Cont PT. Discussed precautions. 2/2/21:likly ant flexor strain - cont with home stretching- ADIS looks good 3/1/21: Continued Psoas pain with resisted straight leg raise but XRs reveal components are well fixed, in good position. Possible cup impingement on psoas tendon. Patient will like to restart PT. Prescribed Mobic. Patient still is disabled from work. Will return in 6 weeks for reevaluation 4/12/21: 4 months postop - much less pain today. Still some aches but normal for course and will resolve over time. Good ROM today. Cont HEP. 11/30/21: 1 year postop doing very well cont HEP, abx for dentist.  7/23/24: 4 years s/p L ADIS and left knee chondromalacia patellae. Discussed that she has hyperlaxity, which is likely causing her knee pain. She is well informed and would like to proceed with PT and Mobic at this time. f/up 6 weeks

## 2024-07-23 NOTE — HISTORY OF PRESENT ILLNESS
[de-identified] : 7/23/24: 4 years s/p L ADIS. She is doing well and happy with the hip. She is now complaining of new onset of left anterior knee pain for the past few months with no injury. Knee is most bothersome with ascending stairs.   Previous Doc: 35-year-old female, presents today for left hip pain for years. No injury. Pain is constant with movement. Pain gets better with advil. Patient has seen other doctors that suggested adis. Patient would like a second opinion. Patient taking Celebrex. Patient has never has any formal pt or other treatments. 12/22/20: 2 weeks s/p left ADIS - different pain than before surgery, concerned about left knee pain since surgery. No fevers/chills. 2/2/21: 2 months postop - started having groin pain last week. Difficulty with stairs and after walking. No fevers/chills. No injury 3/1/21: 3 months post op patient still with groin pain.  4/12/21: Feeling better than last time. Stopped PT last week. Still some groin pain but better than last time. No fevers/chills. 11/30/21: 1 year s/p left ADIS no pain. [FreeTextEntry5] : hx of left hip sx, left knee pain started 6/2024

## 2024-07-23 NOTE — DISCUSSION/SUMMARY
[de-identified] : The patient was advised of the diagnosis.  The natural history of the pathology was explained in full to the patient in layman's terms. All questions were answered.  The risks and benefits of surgical and non-surgical treatment alternatives were explained in full to the patient.  The patient's orthopaedic condition(s) warrants intermittent use of a prescription strength non-steroidal anti-inflammatory medication.  These medications are associated with risks including but not limited to gastrointestinal irritation, kidney damage, hypertension, and bleeding.  The patient understands and will take medications as prescribed.  The patient will stop the medication and consult a physician as needed if problems arise.  Progress Note completed by SLY Eduardo * Dr. Lancaster -- The documentation recorded in this note accurately reflects the decisions made by me during this visit.  I interviewed and examined the patient personally and oversaw all medical decisions.

## 2024-07-23 NOTE — IMAGING
[de-identified] : LEFT HIP Well healed scar  Sensation intact Motor 5/5  Xray 3 views Left hip/pelvis - Implants good position and well fixed - no fracture or dislocation and Leg length wnl   LEFT KNEE Somewhat hypermobile patella Mild PF tenderness ROM 0-130 [Components well fixed, in good position] : Components well fixed, in good position [Left] : left knee [There are no fractures, subluxations or dislocations. No significant abnormalities are seen] : There are no fractures, subluxations or dislocations. No significant abnormalities are seen

## 2024-07-24 ENCOUNTER — APPOINTMENT (OUTPATIENT)
Dept: OTOLARYNGOLOGY | Facility: CLINIC | Age: 39
End: 2024-07-24

## 2024-09-10 ENCOUNTER — APPOINTMENT (OUTPATIENT)
Dept: ORTHOPEDIC SURGERY | Facility: CLINIC | Age: 39
End: 2024-09-10

## 2024-10-15 ENCOUNTER — NON-APPOINTMENT (OUTPATIENT)
Age: 39
End: 2024-10-15

## 2024-10-17 ENCOUNTER — APPOINTMENT (OUTPATIENT)
Dept: SURGERY | Facility: CLINIC | Age: 39
End: 2024-10-17
Payer: COMMERCIAL

## 2024-10-17 VITALS
WEIGHT: 200.62 LBS | HEART RATE: 86 BPM | SYSTOLIC BLOOD PRESSURE: 116 MMHG | DIASTOLIC BLOOD PRESSURE: 88 MMHG | OXYGEN SATURATION: 98 % | HEIGHT: 60 IN | BODY MASS INDEX: 39.39 KG/M2 | TEMPERATURE: 97.4 F

## 2024-10-17 DIAGNOSIS — E66.812 OBESITY, CLASS 2: ICD-10-CM

## 2024-10-17 DIAGNOSIS — E56.9 VITAMIN DEFICIENCY, UNSPECIFIED: ICD-10-CM

## 2024-10-17 DIAGNOSIS — R63.2 POLYPHAGIA: ICD-10-CM

## 2024-10-17 DIAGNOSIS — Z86.39 PERSONAL HISTORY OF OTHER ENDOCRINE, NUTRITIONAL AND METABOLIC DISEASE: ICD-10-CM

## 2024-10-17 DIAGNOSIS — R63.5 ABNORMAL WEIGHT GAIN: ICD-10-CM

## 2024-10-17 DIAGNOSIS — M19.90 UNSPECIFIED OSTEOARTHRITIS, UNSPECIFIED SITE: ICD-10-CM

## 2024-10-17 DIAGNOSIS — E46 UNSPECIFIED PROTEIN-CALORIE MALNUTRITION: ICD-10-CM

## 2024-10-17 DIAGNOSIS — R63.8 OTHER SYMPTOMS AND SIGNS CONCERNING FOOD AND FLUID INTAKE: ICD-10-CM

## 2024-10-17 DIAGNOSIS — E66.9 OBESITY, UNSPECIFIED: ICD-10-CM

## 2024-10-17 DIAGNOSIS — E66.09 OBESITY, CLASS 2: ICD-10-CM

## 2024-10-17 DIAGNOSIS — E83.52 HYPERCALCEMIA: ICD-10-CM

## 2024-10-17 DIAGNOSIS — E11.9 TYPE 2 DIABETES MELLITUS W/OUT COMPLICATIONS: ICD-10-CM

## 2024-10-17 DIAGNOSIS — Z87.42 PERSONAL HISTORY OF OTHER DISEASES OF THE FEMALE GENITAL TRACT: ICD-10-CM

## 2024-10-17 PROCEDURE — 99244 OFF/OP CNSLTJ NEW/EST MOD 40: CPT

## 2024-10-17 RX ORDER — TIRZEPATIDE 2.5 MG/.5ML
2.5 INJECTION, SOLUTION SUBCUTANEOUS
Qty: 4 | Refills: 1 | Status: ACTIVE | COMMUNITY
Start: 2024-10-17 | End: 1900-01-01

## 2024-10-18 LAB
25(OH)D3 SERPL-MCNC: 43.1 NG/ML
ALBUMIN SERPL ELPH-MCNC: 4.4 G/DL
ALP BLD-CCNC: 69 U/L
ALT SERPL-CCNC: 21 U/L
ANION GAP SERPL CALC-SCNC: 11 MMOL/L
AST SERPL-CCNC: 16 U/L
BILIRUB SERPL-MCNC: 0.8 MG/DL
BUN SERPL-MCNC: 12 MG/DL
CALCIUM SERPL-MCNC: 9.5 MG/DL
CALCIUM SERPL-MCNC: 9.5 MG/DL
CHLORIDE SERPL-SCNC: 104 MMOL/L
CO2 SERPL-SCNC: 23 MMOL/L
CREAT SERPL-MCNC: 0.71 MG/DL
EGFR: 111 ML/MIN/1.73M2
ESTIMATED AVERAGE GLUCOSE: 94 MG/DL
FERRITIN SERPL-MCNC: 84 NG/ML
FOLATE SERPL-MCNC: >20 NG/ML
GLUCOSE SERPL-MCNC: 92 MG/DL
HBA1C MFR BLD HPLC: 4.9 %
HCT VFR BLD CALC: 38.4 %
HGB BLD-MCNC: 13.1 G/DL
IRON SATN MFR SERPL: 33 %
IRON SERPL-MCNC: 113 UG/DL
MCHC RBC-ENTMCNC: 31.4 PG
MCHC RBC-ENTMCNC: 34.1 GM/DL
MCV RBC AUTO: 92.1 FL
PARATHYROID HORMONE INTACT: 55 PG/ML
PLATELET # BLD AUTO: 272 K/UL
POTASSIUM SERPL-SCNC: 4.3 MMOL/L
PROT SERPL-MCNC: 7.1 G/DL
RBC # BLD: 4.17 M/UL
RBC # FLD: 12.7 %
SODIUM SERPL-SCNC: 139 MMOL/L
T3FREE SERPL-MCNC: 3.94 PG/ML
T4 FREE SERPL-MCNC: 1.3 NG/DL
TIBC SERPL-MCNC: 337 UG/DL
TSH SERPL-ACNC: 1.64 UIU/ML
UIBC SERPL-MCNC: 224 UG/DL
VIT B12 SERPL-MCNC: 763 PG/ML
WBC # FLD AUTO: 7.21 K/UL

## 2024-10-19 LAB
CHOLEST SERPL-MCNC: 170 MG/DL
HCG SERPL-MCNC: <1 MIU/ML
HDLC SERPL-MCNC: 46 MG/DL
LDLC SERPL CALC-MCNC: 95 MG/DL
NONHDLC SERPL-MCNC: 124 MG/DL
TRIGL SERPL-MCNC: 170 MG/DL

## 2024-10-24 ENCOUNTER — NON-APPOINTMENT (OUTPATIENT)
Age: 39
End: 2024-10-24

## 2024-10-24 ENCOUNTER — APPOINTMENT (OUTPATIENT)
Dept: DERMATOLOGY | Facility: CLINIC | Age: 39
End: 2024-10-24
Payer: COMMERCIAL

## 2024-10-24 VITALS — HEIGHT: 60 IN | WEIGHT: 200 LBS | BODY MASS INDEX: 39.27 KG/M2

## 2024-10-24 DIAGNOSIS — D48.5 NEOPLASM OF UNCERTAIN BEHAVIOR OF SKIN: ICD-10-CM

## 2024-10-24 LAB — VIT B1 SERPL-MCNC: 132.4 NMOL/L

## 2024-10-24 PROCEDURE — 11422 EXC H-F-NK-SP B9+MARG 1.1-2: CPT | Mod: 59

## 2024-10-24 PROCEDURE — 12031 INTMD RPR S/A/T/EXT 2.5 CM/<: CPT | Mod: 59

## 2024-10-24 PROCEDURE — 11102 TANGNTL BX SKIN SINGLE LES: CPT | Mod: 59

## 2024-10-30 LAB — DERMATOLOGY BIOPSY: NORMAL

## 2024-11-04 ENCOUNTER — TRANSCRIPTION ENCOUNTER (OUTPATIENT)
Age: 39
End: 2024-11-04

## 2024-11-04 ENCOUNTER — APPOINTMENT (OUTPATIENT)
Dept: INTERNAL MEDICINE | Facility: CLINIC | Age: 39
End: 2024-11-04

## 2024-11-04 ENCOUNTER — NON-APPOINTMENT (OUTPATIENT)
Age: 39
End: 2024-11-04

## 2024-11-04 ENCOUNTER — OUTPATIENT (OUTPATIENT)
Dept: OUTPATIENT SERVICES | Facility: HOSPITAL | Age: 39
LOS: 1 days | End: 2024-11-04

## 2024-11-05 ENCOUNTER — TRANSCRIPTION ENCOUNTER (OUTPATIENT)
Age: 39
End: 2024-11-05

## 2024-11-22 ENCOUNTER — APPOINTMENT (OUTPATIENT)
Dept: SURGERY | Facility: CLINIC | Age: 39
End: 2024-11-22

## 2024-11-22 VITALS
WEIGHT: 203 LBS | HEART RATE: 68 BPM | TEMPERATURE: 97.2 F | SYSTOLIC BLOOD PRESSURE: 112 MMHG | BODY MASS INDEX: 39.85 KG/M2 | DIASTOLIC BLOOD PRESSURE: 84 MMHG | HEIGHT: 60 IN | OXYGEN SATURATION: 99 %

## 2024-11-22 PROCEDURE — 99213 OFFICE O/P EST LOW 20 MIN: CPT

## 2024-12-04 RX ORDER — TIRZEPATIDE 5 MG/.5ML
5 INJECTION, SOLUTION SUBCUTANEOUS
Qty: 1 | Refills: 0 | Status: ACTIVE | COMMUNITY
Start: 2024-12-04 | End: 1900-01-01

## 2024-12-23 ENCOUNTER — APPOINTMENT (OUTPATIENT)
Dept: SURGERY | Facility: CLINIC | Age: 39
End: 2024-12-23

## 2024-12-23 VITALS
TEMPERATURE: 97.5 F | SYSTOLIC BLOOD PRESSURE: 114 MMHG | HEIGHT: 60 IN | BODY MASS INDEX: 37.74 KG/M2 | OXYGEN SATURATION: 98 % | WEIGHT: 192.24 LBS | DIASTOLIC BLOOD PRESSURE: 72 MMHG | HEART RATE: 88 BPM

## 2024-12-23 DIAGNOSIS — R63.4 ABNORMAL WEIGHT LOSS: ICD-10-CM

## 2024-12-23 DIAGNOSIS — Z79.899 OTHER LONG TERM (CURRENT) DRUG THERAPY: ICD-10-CM

## 2024-12-23 DIAGNOSIS — E66.3 OVERWEIGHT: ICD-10-CM

## 2024-12-23 DIAGNOSIS — E66.9 OBESITY, UNSPECIFIED: ICD-10-CM

## 2024-12-23 PROCEDURE — 99214 OFFICE O/P EST MOD 30 MIN: CPT

## 2024-12-23 RX ORDER — TIRZEPATIDE 7.5 MG/.5ML
7.5 INJECTION, SOLUTION SUBCUTANEOUS
Qty: 4 | Refills: 0 | Status: ACTIVE | COMMUNITY
Start: 2024-12-23 | End: 1900-01-01

## 2025-01-22 ENCOUNTER — APPOINTMENT (OUTPATIENT)
Dept: BARIATRICS | Facility: CLINIC | Age: 40
End: 2025-01-22
Payer: COMMERCIAL

## 2025-01-22 ENCOUNTER — NON-APPOINTMENT (OUTPATIENT)
Age: 40
End: 2025-01-22

## 2025-01-22 VITALS
SYSTOLIC BLOOD PRESSURE: 118 MMHG | HEIGHT: 60 IN | HEART RATE: 83 BPM | WEIGHT: 185 LBS | DIASTOLIC BLOOD PRESSURE: 72 MMHG | OXYGEN SATURATION: 98 % | BODY MASS INDEX: 36.32 KG/M2 | TEMPERATURE: 97.3 F

## 2025-01-22 PROCEDURE — 99213 OFFICE O/P EST LOW 20 MIN: CPT

## 2025-01-22 RX ORDER — TIRZEPATIDE 10 MG/.5ML
10 INJECTION, SOLUTION SUBCUTANEOUS
Qty: 1 | Refills: 0 | Status: ACTIVE | COMMUNITY
Start: 2025-01-22 | End: 1900-01-01

## 2025-02-13 ENCOUNTER — APPOINTMENT (OUTPATIENT)
Dept: BARIATRICS | Facility: CLINIC | Age: 40
End: 2025-02-13

## 2025-02-13 VITALS — WEIGHT: 179.25 LBS | HEIGHT: 60 IN | BODY MASS INDEX: 35.19 KG/M2

## 2025-02-13 PROCEDURE — 99214 OFFICE O/P EST MOD 30 MIN: CPT | Mod: 95

## 2025-02-25 ENCOUNTER — RESULT REVIEW (OUTPATIENT)
Age: 40
End: 2025-02-25

## 2025-02-25 ENCOUNTER — APPOINTMENT (OUTPATIENT)
Dept: MAMMOGRAPHY | Facility: CLINIC | Age: 40
End: 2025-02-25
Payer: COMMERCIAL

## 2025-02-25 ENCOUNTER — OUTPATIENT (OUTPATIENT)
Dept: OUTPATIENT SERVICES | Facility: HOSPITAL | Age: 40
LOS: 1 days | End: 2025-02-25
Payer: COMMERCIAL

## 2025-02-25 DIAGNOSIS — Z12.31 ENCOUNTER FOR SCREENING MAMMOGRAM FOR MALIGNANT NEOPLASM OF BREAST: ICD-10-CM

## 2025-02-25 PROCEDURE — 77063 BREAST TOMOSYNTHESIS BI: CPT | Mod: 26

## 2025-02-25 PROCEDURE — 77067 SCR MAMMO BI INCL CAD: CPT | Mod: 26

## 2025-02-25 PROCEDURE — 77067 SCR MAMMO BI INCL CAD: CPT

## 2025-02-25 PROCEDURE — 77063 BREAST TOMOSYNTHESIS BI: CPT

## 2025-03-13 ENCOUNTER — TRANSCRIPTION ENCOUNTER (OUTPATIENT)
Age: 40
End: 2025-03-13

## 2025-04-02 PROBLEM — R76.8 HEPATITIS C ANTIBODY TEST POSITIVE: Status: RESOLVED | Noted: 2018-01-19 | Resolved: 2025-04-02

## 2025-04-04 ENCOUNTER — APPOINTMENT (OUTPATIENT)
Dept: BARIATRICS | Facility: CLINIC | Age: 40
End: 2025-04-04
Payer: COMMERCIAL

## 2025-04-04 VITALS
OXYGEN SATURATION: 98 % | HEIGHT: 60 IN | BODY MASS INDEX: 32.2 KG/M2 | WEIGHT: 164 LBS | DIASTOLIC BLOOD PRESSURE: 72 MMHG | TEMPERATURE: 97.2 F | HEART RATE: 85 BPM | SYSTOLIC BLOOD PRESSURE: 112 MMHG

## 2025-04-04 DIAGNOSIS — E66.09 OBESITY, CLASS 1: ICD-10-CM

## 2025-04-04 DIAGNOSIS — E66.811 OBESITY, CLASS 1: ICD-10-CM

## 2025-04-04 DIAGNOSIS — R63.4 ABNORMAL WEIGHT LOSS: ICD-10-CM

## 2025-04-04 DIAGNOSIS — T50.905A ABNORMAL WEIGHT LOSS: ICD-10-CM

## 2025-04-04 PROCEDURE — G2211 COMPLEX E/M VISIT ADD ON: CPT

## 2025-04-04 PROCEDURE — 99214 OFFICE O/P EST MOD 30 MIN: CPT

## 2025-04-04 RX ORDER — URSODIOL 200 MG/1
200 CAPSULE ORAL TWICE DAILY
Qty: 60 | Refills: 1 | Status: ACTIVE | COMMUNITY
Start: 2025-04-04 | End: 1900-01-01

## 2025-05-09 ENCOUNTER — APPOINTMENT (OUTPATIENT)
Dept: BARIATRICS | Facility: CLINIC | Age: 40
End: 2025-05-09
Payer: COMMERCIAL

## 2025-05-09 VITALS
OXYGEN SATURATION: 97 % | TEMPERATURE: 97.3 F | HEIGHT: 60 IN | WEIGHT: 160.13 LBS | BODY MASS INDEX: 31.44 KG/M2 | SYSTOLIC BLOOD PRESSURE: 110 MMHG | DIASTOLIC BLOOD PRESSURE: 70 MMHG | HEART RATE: 80 BPM

## 2025-05-09 DIAGNOSIS — E78.5 HYPERLIPIDEMIA, UNSPECIFIED: ICD-10-CM

## 2025-05-09 DIAGNOSIS — M19.90 UNSPECIFIED OSTEOARTHRITIS, UNSPECIFIED SITE: ICD-10-CM

## 2025-05-09 DIAGNOSIS — E66.09 OBESITY, CLASS 1: ICD-10-CM

## 2025-05-09 DIAGNOSIS — E66.811 OBESITY, CLASS 1: ICD-10-CM

## 2025-05-09 PROCEDURE — 99214 OFFICE O/P EST MOD 30 MIN: CPT

## 2025-05-09 RX ORDER — URSODIOL 300 MG/1
300 CAPSULE ORAL
Qty: 60 | Refills: 2 | Status: ACTIVE | COMMUNITY
Start: 2025-05-09 | End: 1900-01-01

## 2025-05-29 ENCOUNTER — APPOINTMENT (OUTPATIENT)
Dept: DERMATOLOGY | Facility: CLINIC | Age: 40
End: 2025-05-29
Payer: COMMERCIAL

## 2025-05-29 DIAGNOSIS — D48.5 NEOPLASM OF UNCERTAIN BEHAVIOR OF SKIN: ICD-10-CM

## 2025-05-29 PROCEDURE — 11422 EXC H-F-NK-SP B9+MARG 1.1-2: CPT

## 2025-05-29 PROCEDURE — 12031 INTMD RPR S/A/T/EXT 2.5 CM/<: CPT

## 2025-06-14 ENCOUNTER — NON-APPOINTMENT (OUTPATIENT)
Age: 40
End: 2025-06-14

## 2025-06-16 ENCOUNTER — TRANSCRIPTION ENCOUNTER (OUTPATIENT)
Age: 40
End: 2025-06-16

## 2025-06-19 ENCOUNTER — APPOINTMENT (OUTPATIENT)
Dept: BARIATRICS | Facility: CLINIC | Age: 40
End: 2025-06-19
Payer: COMMERCIAL

## 2025-06-19 VITALS — HEIGHT: 60 IN | BODY MASS INDEX: 30.92 KG/M2 | WEIGHT: 157.5 LBS

## 2025-06-19 PROCEDURE — 99213 OFFICE O/P EST LOW 20 MIN: CPT | Mod: 95

## 2025-08-15 ENCOUNTER — APPOINTMENT (OUTPATIENT)
Dept: BARIATRICS | Facility: CLINIC | Age: 40
End: 2025-08-15
Payer: COMMERCIAL

## 2025-08-15 VITALS
HEART RATE: 83 BPM | DIASTOLIC BLOOD PRESSURE: 82 MMHG | OXYGEN SATURATION: 99 % | BODY MASS INDEX: 30.23 KG/M2 | HEIGHT: 60 IN | SYSTOLIC BLOOD PRESSURE: 112 MMHG | WEIGHT: 154 LBS | TEMPERATURE: 97.6 F

## 2025-08-15 DIAGNOSIS — E78.5 HYPERLIPIDEMIA, UNSPECIFIED: ICD-10-CM

## 2025-08-15 DIAGNOSIS — Z79.899 OTHER LONG TERM (CURRENT) DRUG THERAPY: ICD-10-CM

## 2025-08-15 DIAGNOSIS — E28.2 POLYCYSTIC OVARIAN SYNDROME: ICD-10-CM

## 2025-08-15 DIAGNOSIS — E66.811 OBESITY, CLASS 1: ICD-10-CM

## 2025-08-15 DIAGNOSIS — E66.09 OBESITY, CLASS 1: ICD-10-CM

## 2025-08-15 PROCEDURE — 99214 OFFICE O/P EST MOD 30 MIN: CPT

## 2025-08-15 RX ORDER — TIRZEPATIDE 12.5 MG/.5ML
12.5 INJECTION, SOLUTION SUBCUTANEOUS
Qty: 1 | Refills: 2 | Status: ACTIVE | COMMUNITY
Start: 2025-08-15 | End: 1900-01-01

## 2025-09-09 ENCOUNTER — APPOINTMENT (OUTPATIENT)
Dept: OBGYN | Facility: CLINIC | Age: 40
End: 2025-09-09
Payer: COMMERCIAL

## 2025-09-09 ENCOUNTER — LABORATORY RESULT (OUTPATIENT)
Age: 40
End: 2025-09-09

## 2025-09-09 VITALS
SYSTOLIC BLOOD PRESSURE: 133 MMHG | HEIGHT: 60 IN | WEIGHT: 155 LBS | BODY MASS INDEX: 30.43 KG/M2 | DIASTOLIC BLOOD PRESSURE: 76 MMHG

## 2025-09-09 DIAGNOSIS — R10.2 PELVIC AND PERINEAL PAIN: ICD-10-CM

## 2025-09-09 DIAGNOSIS — N94.10 UNSPECIFIED DYSPAREUNIA: ICD-10-CM

## 2025-09-09 DIAGNOSIS — N89.8 OTHER SPECIFIED NONINFLAMMATORY DISORDERS OF VAGINA: ICD-10-CM

## 2025-09-09 DIAGNOSIS — Z01.411 ENCOUNTER FOR GYNECOLOGICAL EXAMINATION (GENERAL) (ROUTINE) WITH ABNORMAL FINDINGS: ICD-10-CM

## 2025-09-09 DIAGNOSIS — Z11.3 ENCOUNTER FOR SCREENING FOR INFECTIONS WITH A PREDOMINANTLY SEXUAL MODE OF TRANSMISSION: ICD-10-CM

## 2025-09-09 PROCEDURE — 99396 PREV VISIT EST AGE 40-64: CPT

## 2025-09-09 PROCEDURE — 99459 PELVIC EXAMINATION: CPT

## 2025-09-09 PROCEDURE — 99214 OFFICE O/P EST MOD 30 MIN: CPT | Mod: 25

## 2025-09-11 LAB
C TRACH RRNA SPEC QL NAA+PROBE: NOT DETECTED
CANDIDA VAG CYTO: NOT DETECTED
G VAGINALIS+PREV SP MTYP VAG QL MICRO: NOT DETECTED
HPV HIGH+LOW RISK DNA PNL CVX: DETECTED
N GONORRHOEA RRNA SPEC QL NAA+PROBE: NOT DETECTED
SOURCE TP AMPLIFICATION: NORMAL
T VAGINALIS VAG QL WET PREP: NOT DETECTED

## 2025-09-12 LAB — CYTOLOGY CVX/VAG DOC THIN PREP: ABNORMAL

## 2025-09-18 ENCOUNTER — APPOINTMENT (OUTPATIENT)
Dept: BARIATRICS | Facility: CLINIC | Age: 40
End: 2025-09-18

## 2025-09-18 DIAGNOSIS — E66.811 OBESITY, CLASS 1: ICD-10-CM

## 2025-09-18 DIAGNOSIS — E66.09 OBESITY, CLASS 1: ICD-10-CM

## 2025-09-18 DIAGNOSIS — E28.2 POLYCYSTIC OVARIAN SYNDROME: ICD-10-CM

## 2025-09-18 DIAGNOSIS — E78.5 HYPERLIPIDEMIA, UNSPECIFIED: ICD-10-CM
